# Patient Record
Sex: FEMALE | Race: WHITE | NOT HISPANIC OR LATINO | Employment: OTHER | URBAN - NONMETROPOLITAN AREA
[De-identification: names, ages, dates, MRNs, and addresses within clinical notes are randomized per-mention and may not be internally consistent; named-entity substitution may affect disease eponyms.]

---

## 2018-03-08 ENCOUNTER — HISTORICAL (OUTPATIENT)
Dept: ADMINISTRATIVE | Facility: HOSPITAL | Age: 53
End: 2018-03-08

## 2018-04-03 ENCOUNTER — HISTORICAL (OUTPATIENT)
Dept: ADMINISTRATIVE | Facility: HOSPITAL | Age: 53
End: 2018-04-03

## 2018-08-23 ENCOUNTER — HISTORICAL (OUTPATIENT)
Dept: ADMINISTRATIVE | Facility: HOSPITAL | Age: 53
End: 2018-08-23

## 2018-09-07 ENCOUNTER — HISTORICAL (OUTPATIENT)
Dept: ADMINISTRATIVE | Facility: HOSPITAL | Age: 53
End: 2018-09-07

## 2018-09-24 ENCOUNTER — HISTORICAL (OUTPATIENT)
Dept: ADMINISTRATIVE | Facility: HOSPITAL | Age: 53
End: 2018-09-24

## 2018-11-21 ENCOUNTER — HISTORICAL (OUTPATIENT)
Dept: ADMINISTRATIVE | Facility: HOSPITAL | Age: 53
End: 2018-11-21

## 2019-02-13 ENCOUNTER — HISTORICAL (OUTPATIENT)
Dept: ADMINISTRATIVE | Facility: HOSPITAL | Age: 54
End: 2019-02-13

## 2019-10-03 ENCOUNTER — HISTORICAL (OUTPATIENT)
Dept: ADMINISTRATIVE | Facility: HOSPITAL | Age: 54
End: 2019-10-03

## 2020-11-09 ENCOUNTER — HISTORICAL (OUTPATIENT)
Dept: ADMINISTRATIVE | Facility: HOSPITAL | Age: 55
End: 2020-11-09

## 2021-01-14 LAB
HUMAN PAPILLOMAVIRUS (HPV): NORMAL
PAP RECOMMENDATION EXT: NORMAL
PAP SMEAR: NORMAL

## 2021-01-19 ENCOUNTER — HISTORICAL (OUTPATIENT)
Dept: ADMINISTRATIVE | Facility: HOSPITAL | Age: 56
End: 2021-01-19

## 2021-06-09 ENCOUNTER — HISTORICAL (OUTPATIENT)
Dept: ADMINISTRATIVE | Facility: HOSPITAL | Age: 56
End: 2021-06-09

## 2022-04-06 ENCOUNTER — HISTORICAL (OUTPATIENT)
Dept: ADMINISTRATIVE | Facility: HOSPITAL | Age: 57
End: 2022-04-06

## 2022-04-10 ENCOUNTER — HISTORICAL (OUTPATIENT)
Dept: ADMINISTRATIVE | Facility: HOSPITAL | Age: 57
End: 2022-04-10

## 2022-04-30 VITALS
SYSTOLIC BLOOD PRESSURE: 110 MMHG | DIASTOLIC BLOOD PRESSURE: 76 MMHG | BODY MASS INDEX: 38.98 KG/M2 | WEIGHT: 233.94 LBS | HEIGHT: 65 IN

## 2023-01-16 ENCOUNTER — HOSPITAL ENCOUNTER (EMERGENCY)
Facility: HOSPITAL | Age: 58
Discharge: HOME | End: 2023-01-16
Attending: EMERGENCY MEDICINE
Payer: MEDICAID

## 2023-01-16 VITALS
SYSTOLIC BLOOD PRESSURE: 120 MMHG | OXYGEN SATURATION: 97 % | HEART RATE: 72 BPM | HEIGHT: 66 IN | TEMPERATURE: 98 F | RESPIRATION RATE: 18 BRPM | WEIGHT: 215 LBS | BODY MASS INDEX: 34.55 KG/M2 | DIASTOLIC BLOOD PRESSURE: 76 MMHG

## 2023-01-16 DIAGNOSIS — M77.9 INFLAMMATION AROUND JOINT: ICD-10-CM

## 2023-01-16 DIAGNOSIS — M71.162 SEPTIC PREPATELLAR BURSITIS OF LEFT KNEE: Primary | ICD-10-CM

## 2023-01-16 PROCEDURE — 25000003 PHARM REV CODE 250: Performed by: EMERGENCY MEDICINE

## 2023-01-16 PROCEDURE — 99284 EMERGENCY DEPT VISIT MOD MDM: CPT | Mod: 25

## 2023-01-16 PROCEDURE — 10060 I&D ABSCESS SIMPLE/SINGLE: CPT

## 2023-01-16 RX ORDER — SULFAMETHOXAZOLE AND TRIMETHOPRIM 800; 160 MG/1; MG/1
1 TABLET ORAL 2 TIMES DAILY
Qty: 20 TABLET | Refills: 0 | Status: SHIPPED | OUTPATIENT
Start: 2023-01-16 | End: 2023-01-26

## 2023-01-16 RX ORDER — HYDROCODONE BITARTRATE AND ACETAMINOPHEN 10; 325 MG/1; MG/1
1 TABLET ORAL
Status: COMPLETED | OUTPATIENT
Start: 2023-01-16 | End: 2023-01-16

## 2023-01-16 RX ORDER — HYDROCODONE BITARTRATE AND ACETAMINOPHEN 10; 325 MG/1; MG/1
1 TABLET ORAL EVERY 6 HOURS PRN
Qty: 20 TABLET | Refills: 0 | Status: SHIPPED | OUTPATIENT
Start: 2023-01-16 | End: 2023-01-17

## 2023-01-16 RX ORDER — LIDOCAINE HYDROCHLORIDE 10 MG/ML
5 INJECTION, SOLUTION EPIDURAL; INFILTRATION; INTRACAUDAL; PERINEURAL
Status: COMPLETED | OUTPATIENT
Start: 2023-01-16 | End: 2023-01-16

## 2023-01-16 RX ADMIN — LIDOCAINE HYDROCHLORIDE 50 MG: 10 INJECTION, SOLUTION EPIDURAL; INFILTRATION; INTRACAUDAL; PERINEURAL at 09:01

## 2023-01-16 RX ADMIN — HYDROCODONE BITARTRATE AND ACETAMINOPHEN 1 TABLET: 10; 325 TABLET ORAL at 09:01

## 2023-01-16 NOTE — ED PROVIDER NOTES
Encounter Date: 1/16/2023       History     Chief Complaint   Patient presents with    Knee Pain     C/o left knee pain with redness and swelling for 3 days     The history is provided by the patient. No  was used.   Knee Pain  This is a new problem. The current episode started more than 2 days ago. The problem occurs constantly. The problem has been gradually worsening. Pertinent negatives include no chest pain and no shortness of breath. The symptoms are aggravated by bending, standing and walking. Nothing relieves the symptoms. She has tried a warm compress for the symptoms. The treatment provided no relief.   Review of patient's allergies indicates:  No Known Allergies  Past Medical History:   Diagnosis Date    COPD (chronic obstructive pulmonary disease)      No past surgical history on file. noncontributory  No family history on file.  Social History     Tobacco Use    Smoking status: Every Day     Types: Cigarettes    Smokeless tobacco: Never     Review of Systems   Constitutional:  Negative for fever.   HENT:  Negative for sore throat.    Respiratory:  Negative for shortness of breath.    Cardiovascular:  Negative for chest pain.   Gastrointestinal:  Negative for nausea.   Genitourinary:  Negative for dysuria.   Musculoskeletal:  Negative for back pain.   Skin:  Negative for rash.   Neurological:  Negative for weakness.   Hematological:  Does not bruise/bleed easily.     Physical Exam     Initial Vitals [01/16/23 0828]   BP Pulse Resp Temp SpO2   120/76 77 16 97.6 °F (36.4 °C) 97 %      MAP       --         Physical Exam    Nursing note and vitals reviewed.  Constitutional: She appears well-developed and well-nourished.   HENT:   Head: Normocephalic and atraumatic.   Right Ear: External ear normal.   Left Ear: External ear normal.   Eyes: Conjunctivae and EOM are normal. Pupils are equal, round, and reactive to light.   Neck: Neck supple.   Normal range of motion.  Cardiovascular:  Normal  rate, regular rhythm, normal heart sounds and intact distal pulses.           Pulmonary/Chest: Breath sounds normal.   Abdominal: Abdomen is soft. Bowel sounds are normal.   Musculoskeletal:         General: Normal range of motion.      Cervical back: Normal range of motion and neck supple.        Legs:       Comments: Area of fluctuance central to the erythematous area     Neurological: She is alert and oriented to person, place, and time. GCS score is 15. GCS eye subscore is 4. GCS verbal subscore is 5. GCS motor subscore is 6.   Skin: Skin is warm and dry. Capillary refill takes less than 2 seconds.   Psychiatric: She has a normal mood and affect. Her behavior is normal. Judgment and thought content normal.       ED Course   I & D - Incision and Drainage    Date/Time: 1/16/2023 9:39 AM  Location procedure was performed: [facility] EMERGENCY DEPARTMENT  Performed by: Vickey Friend MD  Authorized by: Vickey Friend MD   Pre-operative diagnosis: septic prepatellar bursitis left knee  Post-operative diagnosis: same  Consent Done: Yes  Consent: Verbal consent obtained.  Consent given by: patient  Patient understanding: patient states understanding of the procedure being performed  Patient identity confirmed: verbally with patient  Type: abscess  Body area: lower extremity  Location details: left leg  Anesthesia: local infiltration    Anesthesia:  Local Anesthetic: lidocaine 1% without epinephrine  Anesthetic total: 3 mL    Patient sedated: no  Description of findings: largely bloody drainage with scant pus   Scalpel size: 11  Incision type: single straight  Complexity: simple  Drainage: bloody  Drainage amount: moderate  Wound treatment: incision, drainage, expression of material, wound left open, wound packed and deloculation  Packing material: 1/4 in iodoform gauze  Technical procedures used: N/A  Significant surgical tasks conducted by the assistant(s): N/A  Complications: No  Estimated blood loss  (mL): 7  Specimens: No  Implants: No  Comments: Minimal pus, mostly bloody drainage.  Pt states she has been kneeling down a good bit doing tasks around her home.  Possibly infected hematoma.      Labs Reviewed - No data to display       Imaging Results              X-Ray Knee 3 View Left (Final result)  Result time 01/16/23 09:11:49      Final result by Carlos Clark MD (01/16/23 09:11:49)                   Impression:      Mild tricompartmental osteoarthritis of the left knee.      Electronically signed by: Carlos Clark MD  Date:    01/16/2023  Time:    09:11               Narrative:    EXAMINATION:  Three radiographic views of the KNEE.    CLINICAL HISTORY:  Enthesopathy, unspecified    TECHNIQUE:  Three radiographic views of the KNEE    COMPARISON:  Left knee radiograph 09/24/2018.    FINDINGS:  Three views of the left knee demonstrate normal alignment.  There is mild tricompartmental osteoarthritis.  There is an unchanged osseous fragment adjacent to the upper pole of the patella which is well corticated consistent with an avulsion fracture which is unchanged from the prior exam.  There is no soft tissue swelling.                                       Medications   HYDROcodone-acetaminophen  mg per tablet 1 tablet (1 tablet Oral Given 1/16/23 0900)   LIDOcaine (PF) 10 mg/ml (1%) injection 50 mg (50 mg Infiltration Given 1/16/23 0900)      Exam suggestive of septic prepatellar bursitis.  Will get X-ray and perform I&D of the area.                 Procedure well tolerated.  Fluctuant area drained in its entirety and loculations broken up.  Packed with iodoform and wound care instructions given.  Pt verbalizes understanding.  No barriers to treatment were encountered.       Clinical Impression:   Final diagnoses:  [M77.9] Inflammation around joint  [M71.162] Septic prepatellar bursitis of left knee (Primary)        ED Disposition Condition    Discharge Stable          ED Prescriptions       Medication Sig  Dispense Start Date End Date Auth. Provider    sulfamethoxazole-trimethoprim 800-160mg (BACTRIM DS) 800-160 mg Tab Take 1 tablet by mouth 2 (two) times daily. for 10 days 20 tablet 1/16/2023 1/26/2023 Vickey Friend MD    HYDROcodone-acetaminophen (NORCO)  mg per tablet Take 1 tablet by mouth every 6 (six) hours as needed for Pain. 20 tablet 1/16/2023 1/21/2023 Vickey Friend MD          Follow-up Information       Follow up With Specialties Details Why Contact Info    Follow up with your primary MD in 3-5 days if not improved.  Return to ED for worsening symptoms.                 Vickey Friend MD  01/16/23 [id number]

## 2023-01-17 ENCOUNTER — HOSPITAL ENCOUNTER (OUTPATIENT)
Dept: RADIOLOGY | Facility: HOSPITAL | Age: 58
Discharge: HOME | End: 2023-01-17
Attending: FAMILY MEDICINE
Payer: MEDICARE

## 2023-01-17 DIAGNOSIS — M25.561 PAIN IN RIGHT KNEE: ICD-10-CM

## 2023-01-17 PROCEDURE — 73562 X-RAY EXAM OF KNEE 3: CPT | Mod: TC,RT

## 2023-01-19 ENCOUNTER — DOCUMENTATION ONLY (OUTPATIENT)
Dept: ADMINISTRATIVE | Facility: HOSPITAL | Age: 58
End: 2023-01-19
Payer: MEDICARE

## 2023-01-19 DIAGNOSIS — M71.162 SEPTIC PREPATELLAR BURSITIS, LEFT: Primary | ICD-10-CM

## 2023-01-30 ENCOUNTER — HOSPITAL ENCOUNTER (OUTPATIENT)
Dept: RADIOLOGY | Facility: HOSPITAL | Age: 58
Discharge: HOME | End: 2023-01-30
Attending: FAMILY MEDICINE
Payer: MEDICARE

## 2023-01-30 DIAGNOSIS — M71.162 SEPTIC PREPATELLAR BURSITIS, LEFT: ICD-10-CM

## 2023-01-30 PROCEDURE — 76882 US LMTD JT/FCL EVL NVASC XTR: CPT | Mod: TC,LT

## 2023-04-18 DIAGNOSIS — M25.562 BILATERAL KNEE PAIN: Primary | ICD-10-CM

## 2023-04-18 DIAGNOSIS — M25.561 BILATERAL KNEE PAIN: Primary | ICD-10-CM

## 2023-04-19 ENCOUNTER — OFFICE VISIT (OUTPATIENT)
Dept: ORTHOPEDICS | Facility: CLINIC | Age: 58
End: 2023-04-19
Payer: MEDICARE

## 2023-04-19 VITALS
SYSTOLIC BLOOD PRESSURE: 128 MMHG | BODY MASS INDEX: 35.68 KG/M2 | HEART RATE: 72 BPM | HEIGHT: 66 IN | WEIGHT: 222 LBS | DIASTOLIC BLOOD PRESSURE: 65 MMHG

## 2023-04-19 DIAGNOSIS — M25.561 BILATERAL KNEE PAIN: ICD-10-CM

## 2023-04-19 DIAGNOSIS — M25.562 BILATERAL KNEE PAIN: ICD-10-CM

## 2023-04-19 DIAGNOSIS — M17.11 PRIMARY OSTEOARTHRITIS OF RIGHT KNEE: Primary | ICD-10-CM

## 2023-04-19 PROCEDURE — 20610 DRAIN/INJ JOINT/BURSA W/O US: CPT | Mod: RT,,, | Performed by: REHABILITATION UNIT

## 2023-04-19 PROCEDURE — 3074F PR MOST RECENT SYSTOLIC BLOOD PRESSURE < 130 MM HG: ICD-10-PCS | Mod: CPTII,,, | Performed by: REHABILITATION UNIT

## 2023-04-19 PROCEDURE — 3078F DIAST BP <80 MM HG: CPT | Mod: CPTII,,, | Performed by: REHABILITATION UNIT

## 2023-04-19 PROCEDURE — 20610 LARGE JOINT ASPIRATION/INJECTION: R KNEE: ICD-10-PCS | Mod: RT,,, | Performed by: REHABILITATION UNIT

## 2023-04-19 PROCEDURE — 3074F SYST BP LT 130 MM HG: CPT | Mod: CPTII,,, | Performed by: REHABILITATION UNIT

## 2023-04-19 PROCEDURE — 3008F PR BODY MASS INDEX (BMI) DOCUMENTED: ICD-10-PCS | Mod: CPTII,,, | Performed by: REHABILITATION UNIT

## 2023-04-19 PROCEDURE — 99203 PR OFFICE/OUTPT VISIT, NEW, LEVL III, 30-44 MIN: ICD-10-PCS | Mod: 25,,, | Performed by: REHABILITATION UNIT

## 2023-04-19 PROCEDURE — 3008F BODY MASS INDEX DOCD: CPT | Mod: CPTII,,, | Performed by: REHABILITATION UNIT

## 2023-04-19 PROCEDURE — 1159F MED LIST DOCD IN RCRD: CPT | Mod: CPTII,,, | Performed by: REHABILITATION UNIT

## 2023-04-19 PROCEDURE — 99203 OFFICE O/P NEW LOW 30 MIN: CPT | Mod: 25,,, | Performed by: REHABILITATION UNIT

## 2023-04-19 PROCEDURE — 3078F PR MOST RECENT DIASTOLIC BLOOD PRESSURE < 80 MM HG: ICD-10-PCS | Mod: CPTII,,, | Performed by: REHABILITATION UNIT

## 2023-04-19 PROCEDURE — 1159F PR MEDICATION LIST DOCUMENTED IN MEDICAL RECORD: ICD-10-PCS | Mod: CPTII,,, | Performed by: REHABILITATION UNIT

## 2023-04-19 RX ORDER — DULOXETIN HYDROCHLORIDE 60 MG/1
60 CAPSULE, DELAYED RELEASE ORAL
COMMUNITY
Start: 2023-03-27

## 2023-04-19 RX ORDER — FLUTICASONE FUROATE, UMECLIDINIUM BROMIDE AND VILANTEROL TRIFENATATE 200; 62.5; 25 UG/1; UG/1; UG/1
1 POWDER RESPIRATORY (INHALATION)
COMMUNITY
Start: 2023-03-27

## 2023-04-19 RX ORDER — ALBUTEROL SULFATE 90 UG/1
2 AEROSOL, METERED RESPIRATORY (INHALATION)
COMMUNITY
Start: 2023-03-27

## 2023-04-19 RX ORDER — ESOMEPRAZOLE MAGNESIUM 40 MG/1
CAPSULE, DELAYED RELEASE ORAL DAILY PRN
COMMUNITY

## 2023-04-19 RX ORDER — CELECOXIB 200 MG/1
200 CAPSULE ORAL
Status: ON HOLD | COMMUNITY
Start: 2023-03-27 | End: 2024-01-11 | Stop reason: HOSPADM

## 2023-04-19 RX ORDER — ALBUTEROL SULFATE 0.83 MG/ML
SOLUTION RESPIRATORY (INHALATION)
COMMUNITY

## 2023-04-19 RX ORDER — PANTOPRAZOLE SODIUM 40 MG/1
40 TABLET, DELAYED RELEASE ORAL EVERY MORNING
COMMUNITY
Start: 2023-03-27

## 2023-04-19 RX ORDER — CYCLOBENZAPRINE HCL 10 MG
10 TABLET ORAL
Status: ON HOLD | COMMUNITY
Start: 2023-03-27 | End: 2024-01-11 | Stop reason: HOSPADM

## 2023-04-19 RX ORDER — LIDOCAINE 50 MG/G
PATCH TOPICAL
COMMUNITY

## 2023-04-19 RX ORDER — PREGABALIN 100 MG/1
100 CAPSULE ORAL 3 TIMES DAILY
COMMUNITY
Start: 2023-03-27

## 2023-04-19 RX ADMIN — LIDOCAINE HYDROCHLORIDE 4 MG: 20 INJECTION, SOLUTION INFILTRATION; PERINEURAL at 02:04

## 2023-04-19 RX ADMIN — BETAMETHASONE SODIUM PHOSPHATE AND BETAMETHASONE ACETATE 12 MG: 3; 3 INJECTION, SUSPENSION INTRA-ARTICULAR; INTRALESIONAL; INTRAMUSCULAR; SOFT TISSUE at 02:04

## 2023-04-19 NOTE — PROGRESS NOTES
Subjective:      Patient ID: Yaneth Deluna is a 57 y.o. female.    Chief Complaint: Knee Pain (Pt reports a constant pain in her Rt knee that aggravates when she is walking. Pain feels sharp/stabbing at times, feels like will give out on her very often and unable patient to do her daily activities. Pt had several cortisone injections in the past, but the last ones didnt help as expected.)    HPI:   Yaneth Deluna is a 57 y.o. female who presents today for initial evaluation of her right knee.   She has an acute exacerbation of her chronic right knee pain.  Pain has been present to her right knee for several years.  She has previously been under the treatment of Dr. Rocha her primary care provider.  He has been providing her with steroid injections.  Her most recent injection was 01/17/2023.  She reports good relief of her pain but this is temporary.  Pain is localized diffusely about her knee.  It is  interfering with her activities of daily living.  She has tried pain patches and is also taking Celebrex.  Past medical history is significant for COPD and she is smoking 1 pack per day.  No instability reported.  She does report reduced motion with her knee pain.      Past Medical History:   Diagnosis Date    Arthritis     COPD (chronic obstructive pulmonary disease)     Osteoarthritis      Past Surgical History:   Procedure Laterality Date    BACK SURGERY      CARPAL TUNNEL RELEASE Bilateral     CHOLECYSTECTOMY      NECK FUSION      SHOULDER SURGERY       Social History     Socioeconomic History    Marital status:    Tobacco Use    Smoking status: Every Day     Packs/day: 1.00     Types: Cigarettes    Smokeless tobacco: Never   Substance and Sexual Activity    Alcohol use: Yes     Alcohol/week: 2.0 standard drinks     Types: 1 Glasses of wine, 1 Cans of beer per week     Comment: Occasionally    Drug use: Never    Sexual activity: Yes     Partners: Male         Current Outpatient Medications:     albuterol  "(PROVENTIL) 2.5 mg /3 mL (0.083 %) nebulizer solution, albuterol sulfate 2.5 mg/3 mL (0.083 %) solution for nebulization  use as needed, Disp: , Rfl:     albuterol (PROVENTIL/VENTOLIN HFA) 90 mcg/actuation inhaler, Inhale 2 puffs into the lungs., Disp: , Rfl:     celecoxib (CELEBREX) 200 MG capsule, Take 200 mg by mouth., Disp: , Rfl:     cyclobenzaprine (FLEXERIL) 10 MG tablet, Take 10 mg by mouth., Disp: , Rfl:     DULoxetine (CYMBALTA) 60 MG capsule, Take 60 mg by mouth., Disp: , Rfl:     esomeprazole (NEXIUM) 40 MG capsule, esomeprazole magnesium 40 mg capsule,delayed release, Disp: , Rfl:     LIDOcaine (LIDODERM) 5 %, lidocaine 5 % topical patch, Disp: , Rfl:     LYRICA 100 mg capsule, Take 100 mg by mouth 3 (three) times daily., Disp: , Rfl:     pantoprazole (PROTONIX) 40 MG tablet, Take 40 mg by mouth every morning., Disp: , Rfl:     TRELEGY ELLIPTA 200-62.5-25 mcg inhaler, Inhale 1 puff into the lungs., Disp: , Rfl:   Review of patient's allergies indicates:  No Known Allergies    /65   Pulse 72   Ht 5' 6" (1.676 m)   Wt 100.7 kg (222 lb)   BMI 35.83 kg/m²     Comprehensive review of systems completed and negative except as per HPI.        Objective:   Head: Normocephalic, without obvious abnormality, atraumatic  Eyes: conjunctivae/corneas clear. EOM's intact  Ears: normal external appearance  Nose: Nares normal. Septum midline. Mucosa normal. No drainage  Throat: normal findings: lips normal without lesions  Lungs: unlabored breathing on room air  Chest wall: symmetric chest rise  Heart: regular rate and rhythm  Pulses: 2+ and symmetric  Skin: Skin color, texture, turgor normal. No rashes or lesions  Neurologic: Grossly normal    right KNEE:     Alignment: neutral  Appearance: skin in intact without lesion  Effusion: small  Gait: antalgic  Straight Leg Raise: negative  Log Roll: negative  Hip ROM: full and painless  Ankle ROM: full and painless   Knee ROM:  0 - 100  Tenderness: TTP to medial joint " line and diffusely   Patellar Mobility: reduced  Patellar grind: +  PF Crepitus: +       Valgus Stress @ 0 deg: stable  Valgus Stress @ 30 deg: stable  Varus Stress @ 0 deg: stable  Varus Stress @ 30 deg: stable  Lachman: stable  Ant Drawer: negative   Post Drawer: negative  Posterior Sag Sign: negative  Neurological deficits: SILT dp/sp/t distributions  Motor: 5/5 EHL/FHL/TA/GS    Warm well perfused lower extremity with capillary refill less than 2 seconds and sensation intact to light touch in the terminal nerve distributions. Calf soft and easily compressible without clinical sign of DVT. No palpable popliteal lymphadenopathy    Assessment:     Imaging:   Narrative & Impression  EXAMINATION:  XR KNEE 3 VIEW RIGHT     CLINICAL HISTORY:  Pain in right knee; Pain in right knee;.     COMPARISON:  None available.     FINDINGS:  AP, lateral, and oblique views reveal no definite fracture or dislocation.  There is narrowing of medial compartment space.  Scattered osteophyte formation is seen.     Impression:     1. No acute osseous defect identified  2. Osteoarthritis  3. MR examination would allow further evaluation if clinically indicated        Electronically signed by: Regino Iraheta  Date:                                            01/17/2023  Time:                                           14:50      Radiographs of the right knee reviewed showing osteoarthritis changes with joint space narrowing and osteophyte formation.    Large Joint Aspiration/Injection: R knee    Date/Time: 4/19/2023 2:00 PM  Performed by: Ifeanyi Braga MD  Authorized by: Ifeanyi Braga MD     Consent Done?:  Yes (Verbal)  Indications:  Arthritis and pain  Site marked: the procedure site was marked    Timeout: prior to procedure the correct patient, procedure, and site was verified    Prep: patient was prepped and draped in usual sterile fashion    Local anesthesia used?: No      Details:  Needle Size:  21 G  Ultrasonic Guidance for needle  placement?: No    Approach:  Lateral  Location:  Knee  Site:  R knee  Medications:  12 mg betamethasone acetate-betamethasone sodium phosphate 6 mg/mL; 4 mg LIDOcaine HCL 20 mg/ml (2%) 20 mg/mL (2 %)  Patient tolerance:  Patient tolerated the procedure well with no immediate complications          1. Primary osteoarthritis of right knee    2. Bilateral knee pain          Plan:       Orders Placed This Encounter    Large Joint Aspiration/Injection       Imaging and exam findings discussed with the patient.  She has acute exacerbation of her chronic right knee pain with known osteoarthritis.  We discussed her options.  She elects to proceed with corticosteroid injection which was provided as above and she tolerated this well.  I have also counseled her on weight loss and smoking cessation.  She will follow up with me as needed when she has return of her knee pain.   She is aware that injections may be completed as frequently as every 3 months.  She may also elect to proceed with viscosupplementation at her next visit.  I have advised her to let us know this prior to her visit so that we can have it approved if this is the route she chooses. All of her questions were answered and she was in agreement.

## 2023-04-20 RX ORDER — BETAMETHASONE SODIUM PHOSPHATE AND BETAMETHASONE ACETATE 3; 3 MG/ML; MG/ML
12 INJECTION, SUSPENSION INTRA-ARTICULAR; INTRALESIONAL; INTRAMUSCULAR; SOFT TISSUE
Status: DISCONTINUED | OUTPATIENT
Start: 2023-04-19 | End: 2023-04-20 | Stop reason: HOSPADM

## 2023-04-20 RX ORDER — LIDOCAINE HYDROCHLORIDE 20 MG/ML
4 INJECTION, SOLUTION INFILTRATION; PERINEURAL
Status: DISCONTINUED | OUTPATIENT
Start: 2023-04-19 | End: 2023-04-20 | Stop reason: HOSPADM

## 2023-05-01 ENCOUNTER — HOSPITAL ENCOUNTER (OUTPATIENT)
Dept: RADIOLOGY | Facility: CLINIC | Age: 58
Discharge: HOME | End: 2023-05-01
Attending: REHABILITATION UNIT
Payer: MEDICARE

## 2023-05-01 ENCOUNTER — OFFICE VISIT (OUTPATIENT)
Dept: ORTHOPEDICS | Facility: CLINIC | Age: 58
End: 2023-05-01
Payer: MEDICARE

## 2023-05-01 DIAGNOSIS — M25.561 RIGHT KNEE PAIN, UNSPECIFIED CHRONICITY: ICD-10-CM

## 2023-05-01 DIAGNOSIS — M25.561 RIGHT KNEE PAIN, UNSPECIFIED CHRONICITY: Primary | ICD-10-CM

## 2023-05-01 DIAGNOSIS — M17.11 OSTEOARTHRITIS OF RIGHT KNEE, UNSPECIFIED OSTEOARTHRITIS TYPE: ICD-10-CM

## 2023-05-01 PROCEDURE — 73564 X-RAY EXAM KNEE 4 OR MORE: CPT | Mod: RT,,, | Performed by: REHABILITATION UNIT

## 2023-05-01 PROCEDURE — 73564 XR KNEE COMP 4 OR MORE VIEWS RIGHT: ICD-10-PCS | Mod: RT,,, | Performed by: REHABILITATION UNIT

## 2023-05-01 PROCEDURE — 1159F PR MEDICATION LIST DOCUMENTED IN MEDICAL RECORD: ICD-10-PCS | Mod: CPTII,,, | Performed by: REHABILITATION UNIT

## 2023-05-01 PROCEDURE — 99213 PR OFFICE/OUTPT VISIT, EST, LEVL III, 20-29 MIN: ICD-10-PCS | Mod: ,,, | Performed by: REHABILITATION UNIT

## 2023-05-01 PROCEDURE — 1159F MED LIST DOCD IN RCRD: CPT | Mod: CPTII,,, | Performed by: REHABILITATION UNIT

## 2023-05-01 PROCEDURE — 99213 OFFICE O/P EST LOW 20 MIN: CPT | Mod: ,,, | Performed by: REHABILITATION UNIT

## 2023-05-01 NOTE — PROGRESS NOTES
Subjective:      Patient ID: Yaneth Deluna is a 57 y.o. female.    Chief Complaint: Pain of the Right Knee and Pain (Last injection back in january states it didnt help at all and also has a prior SX on her right knee forgot to mention it at her last her appoinment would like to do an MRI feels a pulling sensation on the back and side)    HPI:   Yaneth Deluna is a 57 y.o. female who presents today for follow up evaluation of her right knee.   She was last seen on 04/19/2023.  She was provided with a steroid injection.  She reports that this provided very mild relief for a very short period of time.  She is here to discuss any additional options.  No instability or mechanical symptoms.  Pain is diffusely about the knee.  She has reduced range of motion and strength.  No sensory or motor changes.  She continues to smoke.    Initial HPI:  She has an acute exacerbation of her chronic right knee pain.  Pain has been present to her right knee for several years.  She has previously been under the treatment of Dr. Rocha her primary care provider.  He has been providing her with steroid injections.  Her most recent injection was 01/17/2023.  She reports good relief of her pain but this is temporary.  Pain is localized diffusely about her knee.  It is  interfering with her activities of daily living.  She has tried pain patches and is also taking Celebrex.  Past medical history is significant for COPD and she is smoking 1 pack per day.  No instability reported.  She does report reduced motion with her knee pain.      Past Medical History:   Diagnosis Date    Arthritis     COPD (chronic obstructive pulmonary disease)     Osteoarthritis      Past Surgical History:   Procedure Laterality Date    BACK SURGERY      CARPAL TUNNEL RELEASE Bilateral     CHOLECYSTECTOMY      KNEE SURGERY      NECK FUSION      SHOULDER SURGERY       Social History     Socioeconomic History    Marital status:    Tobacco Use    Smoking status:  Every Day     Packs/day: 1.00     Types: Cigarettes    Smokeless tobacco: Never   Substance and Sexual Activity    Alcohol use: Yes     Alcohol/week: 2.0 standard drinks     Types: 1 Glasses of wine, 1 Cans of beer per week     Comment: Occasionally    Drug use: Never    Sexual activity: Yes     Partners: Male         Current Outpatient Medications:     albuterol (PROVENTIL) 2.5 mg /3 mL (0.083 %) nebulizer solution, albuterol sulfate 2.5 mg/3 mL (0.083 %) solution for nebulization  use as needed, Disp: , Rfl:     albuterol (PROVENTIL/VENTOLIN HFA) 90 mcg/actuation inhaler, Inhale 2 puffs into the lungs., Disp: , Rfl:     celecoxib (CELEBREX) 200 MG capsule, Take 200 mg by mouth., Disp: , Rfl:     cyclobenzaprine (FLEXERIL) 10 MG tablet, Take 10 mg by mouth., Disp: , Rfl:     DULoxetine (CYMBALTA) 60 MG capsule, Take 60 mg by mouth., Disp: , Rfl:     esomeprazole (NEXIUM) 40 MG capsule, esomeprazole magnesium 40 mg capsule,delayed release, Disp: , Rfl:     LIDOcaine (LIDODERM) 5 %, lidocaine 5 % topical patch, Disp: , Rfl:     LYRICA 100 mg capsule, Take 100 mg by mouth 3 (three) times daily., Disp: , Rfl:     pantoprazole (PROTONIX) 40 MG tablet, Take 40 mg by mouth every morning., Disp: , Rfl:     TRELEGY ELLIPTA 200-62.5-25 mcg inhaler, Inhale 1 puff into the lungs., Disp: , Rfl:   Review of patient's allergies indicates:  No Known Allergies    There were no vitals taken for this visit.    Comprehensive review of systems completed and negative except as per HPI.        Objective:   Head: Normocephalic, without obvious abnormality, atraumatic  Eyes: conjunctivae/corneas clear. EOM's intact  Ears: normal external appearance  Nose: Nares normal. Septum midline. Mucosa normal. No drainage  Throat: normal findings: lips normal without lesions  Lungs: unlabored breathing on room air  Chest wall: symmetric chest rise  Heart: regular rate and rhythm  Pulses: 2+ and symmetric  Skin: Skin color, texture, turgor normal. No  rashes or lesions  Neurologic: Grossly normal    right KNEE:     Alignment: neutral  Appearance: skin in intact without lesion  Effusion: small  Gait: antalgic  Straight Leg Raise: negative  Log Roll: negative  Hip ROM: full and painless  Ankle ROM: full and painless   Knee ROM:  0 - 100  Tenderness: TTP to medial joint line and diffusely   Patellar Mobility: reduced  Patellar grind: +  PF Crepitus: +       Valgus Stress @ 0 deg: stable  Valgus Stress @ 30 deg: stable  Varus Stress @ 0 deg: stable  Varus Stress @ 30 deg: stable  Lachman: stable  Ant Drawer: negative   Post Drawer: negative  Posterior Sag Sign: negative  Neurological deficits: SILT dp/sp/t distributions  Motor: 5/5 EHL/FHL/TA/GS    Warm well perfused lower extremity with capillary refill less than 2 seconds and sensation intact to light touch in the terminal nerve distributions. Calf soft and easily compressible without clinical sign of DVT. No palpable popliteal lymphadenopathy    Assessment:     Imaging:   Narrative & Impression  EXAMINATION:  XR KNEE 3 VIEW RIGHT     CLINICAL HISTORY:  Pain in right knee; Pain in right knee;.     COMPARISON:  None available.     FINDINGS:  AP, lateral, and oblique views reveal no definite fracture or dislocation.  There is narrowing of medial compartment space.  Scattered osteophyte formation is seen.     Impression:     1. No acute osseous defect identified  2. Osteoarthritis  3. MR examination would allow further evaluation if clinically indicated        Electronically signed by: Regino Iraheta  Date:                                            01/17/2023  Time:                                           14:50      Radiographs of the right knee reviewed showing osteoarthritis changes with joint space narrowing and osteophyte formation.          1. Right knee pain, unspecified chronicity    2. Osteoarthritis of right knee, unspecified osteoarthritis type            Plan:       Orders Placed This Encounter    X-Ray  Knee Complete 4 Or More Views Right    Prior authorization Order     Imaging and exam findings discussed with the patient.  She has persistent right knee pain after very short term relief after a steroid injection.  She would like to proceed with viscosupplementation.  This will be submitted for and I will see her back once this is approved.  Continue to work on weight loss and smoking cessation.  All questions were answered and she was in agreement with the plan.

## 2023-05-22 ENCOUNTER — OFFICE VISIT (OUTPATIENT)
Dept: ORTHOPEDICS | Facility: CLINIC | Age: 58
End: 2023-05-22
Payer: MEDICARE

## 2023-05-22 VITALS
HEIGHT: 66 IN | WEIGHT: 222 LBS | DIASTOLIC BLOOD PRESSURE: 58 MMHG | SYSTOLIC BLOOD PRESSURE: 111 MMHG | BODY MASS INDEX: 35.68 KG/M2 | HEART RATE: 79 BPM

## 2023-05-22 DIAGNOSIS — M17.11 PRIMARY OSTEOARTHRITIS OF RIGHT KNEE: ICD-10-CM

## 2023-05-22 DIAGNOSIS — M17.11 OSTEOARTHRITIS OF RIGHT KNEE, UNSPECIFIED OSTEOARTHRITIS TYPE: Primary | ICD-10-CM

## 2023-05-22 PROCEDURE — 99499 NO LOS: ICD-10-PCS | Mod: ,,, | Performed by: REHABILITATION UNIT

## 2023-05-22 PROCEDURE — 99499 UNLISTED E&M SERVICE: CPT | Mod: ,,, | Performed by: REHABILITATION UNIT

## 2023-05-22 PROCEDURE — 20610 LARGE JOINT ASPIRATION/INJECTION: R KNEE: ICD-10-PCS | Mod: RT,,, | Performed by: REHABILITATION UNIT

## 2023-05-22 PROCEDURE — 20610 DRAIN/INJ JOINT/BURSA W/O US: CPT | Mod: RT,,, | Performed by: REHABILITATION UNIT

## 2023-05-22 NOTE — PROGRESS NOTES
Patient comes in today for scheduled viscosupplementation injection.  This was provided as below and she tolerated it well.  Post-injection care was discussed.  She will follow up as needed.    Large Joint Aspiration/Injection: R knee    Date/Time: 5/22/2023 1:15 PM  Performed by: Ifeanyi Braga MD  Authorized by: Ifeanyi Braga MD     Consent Done?:  Yes (Verbal)  Indications:  Pain and arthritis  Site marked: the procedure site was marked    Timeout: prior to procedure the correct patient, procedure, and site was verified    Prep: patient was prepped and draped in usual sterile fashion      Local anesthesia used?: Yes    Local anesthetic:  Lidocaine 2% without epinephrine  Anesthetic total (ml):  4      Details:  Needle Size:  18 G and 22 G  Ultrasonic Guidance for needle placement?: No    Approach:  Lateral (superolateral)  Location:  Knee  Site:  R knee  Medications:  48 mg hylan g-f 20 48 mg/6 mL  Patient tolerance:  Patient tolerated the procedure well with no immediate complications

## 2023-07-06 ENCOUNTER — TELEPHONE (OUTPATIENT)
Dept: ORTHOPEDICS | Facility: CLINIC | Age: 58
End: 2023-07-06
Payer: MEDICARE

## 2023-07-06 NOTE — TELEPHONE ENCOUNTER
Pt called and lvm stating she would another visco injection in her right knee.     Patient received last visco injection on 05/22/23 which patient would not be eligible to receive another injection at this time. Would be eligible for another injection 6 months and a day from last injection date.     Will speak with Dr. Braga to see if patient would be eligible to receive a cortisone injection as her last inj for cortisone was on 04/19/23.     Please advise

## 2023-07-06 NOTE — TELEPHONE ENCOUNTER
Was able to call patient who advised she would perfer to go through her PCP for cortisone injection. Advised pt as well she would not be due for another shot of viso until November 24, Pt states she will give us a call around November to get shot set up again. Advised pt if she is needing anything in the mean to give us a call. Pt was in agreement with this and voiced her understanding.

## 2023-09-11 ENCOUNTER — OFFICE VISIT (OUTPATIENT)
Dept: ORTHOPEDICS | Facility: CLINIC | Age: 58
End: 2023-09-11
Payer: MEDICARE

## 2023-09-11 VITALS
BODY MASS INDEX: 35.36 KG/M2 | HEIGHT: 66 IN | HEART RATE: 65 BPM | DIASTOLIC BLOOD PRESSURE: 72 MMHG | SYSTOLIC BLOOD PRESSURE: 126 MMHG | WEIGHT: 220 LBS

## 2023-09-11 DIAGNOSIS — M17.11 PRIMARY OSTEOARTHRITIS OF RIGHT KNEE: Primary | ICD-10-CM

## 2023-09-11 PROCEDURE — 3078F DIAST BP <80 MM HG: CPT | Mod: CPTII,,, | Performed by: REHABILITATION UNIT

## 2023-09-11 PROCEDURE — 1159F PR MEDICATION LIST DOCUMENTED IN MEDICAL RECORD: ICD-10-PCS | Mod: CPTII,,, | Performed by: REHABILITATION UNIT

## 2023-09-11 PROCEDURE — 99213 PR OFFICE/OUTPT VISIT, EST, LEVL III, 20-29 MIN: ICD-10-PCS | Mod: ,,, | Performed by: REHABILITATION UNIT

## 2023-09-11 PROCEDURE — 3008F BODY MASS INDEX DOCD: CPT | Mod: CPTII,,, | Performed by: REHABILITATION UNIT

## 2023-09-11 PROCEDURE — 99213 OFFICE O/P EST LOW 20 MIN: CPT | Mod: ,,, | Performed by: REHABILITATION UNIT

## 2023-09-11 PROCEDURE — 3074F SYST BP LT 130 MM HG: CPT | Mod: CPTII,,, | Performed by: REHABILITATION UNIT

## 2023-09-11 PROCEDURE — 3078F PR MOST RECENT DIASTOLIC BLOOD PRESSURE < 80 MM HG: ICD-10-PCS | Mod: CPTII,,, | Performed by: REHABILITATION UNIT

## 2023-09-11 PROCEDURE — 1159F MED LIST DOCD IN RCRD: CPT | Mod: CPTII,,, | Performed by: REHABILITATION UNIT

## 2023-09-11 PROCEDURE — 3074F PR MOST RECENT SYSTOLIC BLOOD PRESSURE < 130 MM HG: ICD-10-PCS | Mod: CPTII,,, | Performed by: REHABILITATION UNIT

## 2023-09-11 PROCEDURE — 3008F PR BODY MASS INDEX (BMI) DOCUMENTED: ICD-10-PCS | Mod: CPTII,,, | Performed by: REHABILITATION UNIT

## 2023-09-11 NOTE — PROGRESS NOTES
Subjective:      Patient ID: Yaneth Deluna is a 58 y.o. female.    Chief Complaint: Follow-up (f/u from injection on 5/22/23. patient states it feels worst. wakes her up at night. patient does ice. )    HPI:   Yaneth Deluna is a 58 y.o. female who presents today for follow up evaluation of her right knee.   She was last seen in March 2023.  She was provided with a viscosupplementation injection.  She reports no significant relief.  She is here to discuss any additional options.  No instability or mechanical symptoms.  Pain is diffusely about the knee.  She has reduced range of motion and strength.  No sensory or motor changes.  She continues to smoke.    Initial HPI:  She has an acute exacerbation of her chronic right knee pain.  Pain has been present to her right knee for several years.  She has previously been under the treatment of Dr. Rocha her primary care provider.  He has been providing her with steroid injections.  Her most recent injection was 01/17/2023.  She reports good relief of her pain but this is temporary.  Pain is localized diffusely about her knee.  It is  interfering with her activities of daily living.  She has tried pain patches and is also taking Celebrex.  Past medical history is significant for COPD and she is smoking 1 pack per day.  No instability reported.  She does report reduced motion with her knee pain.      Past Medical History:   Diagnosis Date    Arthritis     COPD (chronic obstructive pulmonary disease)     Osteoarthritis      Past Surgical History:   Procedure Laterality Date    BACK SURGERY      CARPAL TUNNEL RELEASE Bilateral     CHOLECYSTECTOMY      KNEE SURGERY      NECK FUSION      SHOULDER SURGERY       Social History     Socioeconomic History    Marital status:    Tobacco Use    Smoking status: Every Day     Current packs/day: 1.00     Types: Cigarettes    Smokeless tobacco: Never   Substance and Sexual Activity    Alcohol use: Yes     Alcohol/week: 2.0 standard  "drinks of alcohol     Types: 1 Glasses of wine, 1 Cans of beer per week     Comment: Occasionally    Drug use: Never    Sexual activity: Yes     Partners: Male         Current Outpatient Medications:     albuterol (PROVENTIL) 2.5 mg /3 mL (0.083 %) nebulizer solution, albuterol sulfate 2.5 mg/3 mL (0.083 %) solution for nebulization  use as needed, Disp: , Rfl:     albuterol (PROVENTIL/VENTOLIN HFA) 90 mcg/actuation inhaler, Inhale 2 puffs into the lungs., Disp: , Rfl:     celecoxib (CELEBREX) 200 MG capsule, Take 200 mg by mouth., Disp: , Rfl:     cyclobenzaprine (FLEXERIL) 10 MG tablet, Take 10 mg by mouth., Disp: , Rfl:     DULoxetine (CYMBALTA) 60 MG capsule, Take 60 mg by mouth., Disp: , Rfl:     esomeprazole (NEXIUM) 40 MG capsule, esomeprazole magnesium 40 mg capsule,delayed release, Disp: , Rfl:     LYRICA 100 mg capsule, Take 100 mg by mouth 3 (three) times daily., Disp: , Rfl:     pantoprazole (PROTONIX) 40 MG tablet, Take 40 mg by mouth every morning., Disp: , Rfl:     TRELEGY ELLIPTA 200-62.5-25 mcg inhaler, Inhale 1 puff into the lungs., Disp: , Rfl:     LIDOcaine (LIDODERM) 5 %, lidocaine 5 % topical patch, Disp: , Rfl:   Review of patient's allergies indicates:  No Known Allergies    /72 (BP Location: Right arm, Patient Position: Sitting, BP Method: Large (Automatic))   Pulse 65   Ht 5' 6" (1.676 m)   Wt 99.8 kg (220 lb)   BMI 35.51 kg/m²     Comprehensive review of systems completed and negative except as per HPI.        Objective:   Head: Normocephalic, without obvious abnormality, atraumatic  Eyes: conjunctivae/corneas clear. EOM's intact  Ears: normal external appearance  Nose: Nares normal. Septum midline. Mucosa normal. No drainage  Throat: normal findings: lips normal without lesions  Lungs: unlabored breathing on room air  Chest wall: symmetric chest rise  Heart: regular rate and rhythm  Pulses: 2+ and symmetric  Skin: Skin color, texture, turgor normal. No rashes or " lesions  Neurologic: Grossly normal    right KNEE:     Alignment: neutral  Appearance: skin in intact without lesion  Effusion: small  Gait: antalgic  Straight Leg Raise: negative  Log Roll: negative  Hip ROM: full and painless  Ankle ROM: full and painless   Knee ROM:  0 - 100  Tenderness: TTP to medial joint line and diffusely   Patellar Mobility: reduced  Patellar grind: +  PF Crepitus: +       Valgus Stress @ 0 deg: stable  Valgus Stress @ 30 deg: stable  Varus Stress @ 0 deg: stable  Varus Stress @ 30 deg: stable  Lachman: stable  Ant Drawer: negative   Post Drawer: negative  Posterior Sag Sign: negative  Neurological deficits: SILT dp/sp/t distributions  Motor: 5/5 EHL/FHL/TA/GS    Warm well perfused lower extremity with capillary refill less than 2 seconds and sensation intact to light touch in the terminal nerve distributions. Calf soft and easily compressible without clinical sign of DVT. No palpable popliteal lymphadenopathy    Assessment:     Imaging:   Narrative & Impression  EXAMINATION:  XR KNEE 3 VIEW RIGHT     CLINICAL HISTORY:  Pain in right knee; Pain in right knee;.     COMPARISON:  None available.     FINDINGS:  AP, lateral, and oblique views reveal no definite fracture or dislocation.  There is narrowing of medial compartment space.  Scattered osteophyte formation is seen.     Impression:     1. No acute osseous defect identified  2. Osteoarthritis  3. MR examination would allow further evaluation if clinically indicated        Electronically signed by: Regino Iraheta  Date:                                            01/17/2023  Time:                                           14:50      Radiographs of the right knee reviewed showing osteoarthritis changes with joint space narrowing and osteophyte formation.          1. Primary osteoarthritis of right knee              Plan:            Imaging and exam findings discussed with the patient.  She has persistent right knee pain.  She has tried steroid  injection as well as viscosupplementation with no significant relief.  She has failed nonoperative management and is interested in total knee arthroplasty.  Refer to Dr. Arriaga for surgical evaluation for total knee arthroplasty.  Follow up with me as needed.  She was counseled again on weight loss and smoking cessation. All questions were answered and she was in agreement with the plan.

## 2023-10-05 ENCOUNTER — OFFICE VISIT (OUTPATIENT)
Dept: ORTHOPEDICS | Facility: CLINIC | Age: 58
End: 2023-10-05
Payer: MEDICARE

## 2023-10-05 VITALS
HEART RATE: 61 BPM | BODY MASS INDEX: 35.36 KG/M2 | SYSTOLIC BLOOD PRESSURE: 131 MMHG | WEIGHT: 220 LBS | HEIGHT: 66 IN | DIASTOLIC BLOOD PRESSURE: 67 MMHG

## 2023-10-05 DIAGNOSIS — M17.11 PRIMARY OSTEOARTHRITIS OF RIGHT KNEE: Primary | ICD-10-CM

## 2023-10-05 PROCEDURE — 99214 PR OFFICE/OUTPT VISIT, EST, LEVL IV, 30-39 MIN: ICD-10-PCS | Mod: ,,, | Performed by: ORTHOPAEDIC SURGERY

## 2023-10-05 PROCEDURE — 3075F SYST BP GE 130 - 139MM HG: CPT | Mod: CPTII,,, | Performed by: ORTHOPAEDIC SURGERY

## 2023-10-05 PROCEDURE — 1159F MED LIST DOCD IN RCRD: CPT | Mod: CPTII,,, | Performed by: ORTHOPAEDIC SURGERY

## 2023-10-05 PROCEDURE — 3078F PR MOST RECENT DIASTOLIC BLOOD PRESSURE < 80 MM HG: ICD-10-PCS | Mod: CPTII,,, | Performed by: ORTHOPAEDIC SURGERY

## 2023-10-05 PROCEDURE — 3008F PR BODY MASS INDEX (BMI) DOCUMENTED: ICD-10-PCS | Mod: CPTII,,, | Performed by: ORTHOPAEDIC SURGERY

## 2023-10-05 PROCEDURE — 99214 OFFICE O/P EST MOD 30 MIN: CPT | Mod: ,,, | Performed by: ORTHOPAEDIC SURGERY

## 2023-10-05 PROCEDURE — 3075F PR MOST RECENT SYSTOLIC BLOOD PRESS GE 130-139MM HG: ICD-10-PCS | Mod: CPTII,,, | Performed by: ORTHOPAEDIC SURGERY

## 2023-10-05 PROCEDURE — 3008F BODY MASS INDEX DOCD: CPT | Mod: CPTII,,, | Performed by: ORTHOPAEDIC SURGERY

## 2023-10-05 PROCEDURE — 3078F DIAST BP <80 MM HG: CPT | Mod: CPTII,,, | Performed by: ORTHOPAEDIC SURGERY

## 2023-10-05 PROCEDURE — 1159F PR MEDICATION LIST DOCUMENTED IN MEDICAL RECORD: ICD-10-PCS | Mod: CPTII,,, | Performed by: ORTHOPAEDIC SURGERY

## 2023-10-05 NOTE — PROGRESS NOTES
Chief Complaint:   Chief Complaint   Patient presents with    Right Knee - Pain     Ref by dr palm for Rt knee pain, states has been going on for awhile, states wakes her up at night, states taking otc meds like tylenol for relief, pt states also using a cream, will ice knee as well, states nothing helps,        History of present illness:  58-year-old female presents for evaluation of right knee pain.  Patient has longstanding right knee pain.  We will set treated.  Improved by rest.  She is tried anti-inflammatories.  She is tried multiple injections including steroids as well as viscosupplementation.  This is all failed to relieve her symptoms.  Patient continues to have significant severe pain to the right knee.  Worse with activity improved by rest.  She feels as though she is reached a point disability like to discuss further treatment options.    Past Medical History:   Diagnosis Date    Arthritis     COPD (chronic obstructive pulmonary disease)     Osteoarthritis        Past Surgical History:   Procedure Laterality Date    BACK SURGERY      CARPAL TUNNEL RELEASE Bilateral     CHOLECYSTECTOMY      KNEE SURGERY      NECK FUSION      SHOULDER SURGERY         Current Outpatient Medications   Medication Sig    albuterol (PROVENTIL) 2.5 mg /3 mL (0.083 %) nebulizer solution albuterol sulfate 2.5 mg/3 mL (0.083 %) solution for nebulization   use as needed    albuterol (PROVENTIL/VENTOLIN HFA) 90 mcg/actuation inhaler Inhale 2 puffs into the lungs.    celecoxib (CELEBREX) 200 MG capsule Take 200 mg by mouth.    cyclobenzaprine (FLEXERIL) 10 MG tablet Take 10 mg by mouth.    DULoxetine (CYMBALTA) 60 MG capsule Take 60 mg by mouth.    esomeprazole (NEXIUM) 40 MG capsule esomeprazole magnesium 40 mg capsule,delayed release    LIDOcaine (LIDODERM) 5 % lidocaine 5 % topical patch    LYRICA 100 mg capsule Take 100 mg by mouth 3 (three) times daily.    pantoprazole (PROTONIX) 40 MG tablet Take 40 mg by mouth every  morning.    TRELEGY ELLIPTA 200-62.5-25 mcg inhaler Inhale 1 puff into the lungs.     No current facility-administered medications for this visit.       Review of patient's allergies indicates:  No Known Allergies    Family History   Family history unknown: Yes       Social History     Socioeconomic History    Marital status:    Tobacco Use    Smoking status: Every Day     Current packs/day: 1.00     Types: Cigarettes    Smokeless tobacco: Never   Substance and Sexual Activity    Alcohol use: Yes     Alcohol/week: 2.0 standard drinks of alcohol     Types: 1 Glasses of wine, 1 Cans of beer per week     Comment: Occasionally    Drug use: Never    Sexual activity: Yes     Partners: Male           Review of Systems:    Constitution: Negative for chills, fever, and sweats.  Negative for unexplained weight loss.    HENT:  Negative for headaches and blurry vision.    Cardiovascular:Negative for chest pain or irregular heart beat. Negative for hypertension.    Respiratory:  Negative for cough and shortness of breath.    Gastrointestinal: Negative for abdominal pain, heartburn, melena, nausea, and vomitting.    Genitourinary:  Negative bladder incontinence and dysuria.    Musculoskeletal:  See HPI    Neurological: Negative for numbness.    Psychiatric/Behavioral: Negative for depression.  The patient is not nervous/anxious.      Endocrine: Negative for polyuria    Hematologic/Lymphatic: Negative for bleeding problem.  Does not bruise/bleed easily.    Skin: Negative for poor would healing and rash      Physical Examination:    Vital Signs:    Vitals:    10/05/23 1409   BP: 131/67   Pulse: 61       Body mass index is 35.51 kg/m².    General: No acute distress, alert and oriented, healthy appearing    HEENT: Head is atraumatic, mucous membranes are moist    Neck: Supples, no JVD    Cardiovascular: Palpable dorsalis pedis and posterior tibial pulses, regular rate and rhythm to those pulses    Lungs: Breathing  non-labored    Skin: no rashes appreciated    Neurologic: Can flex and extend knees, ankles, and toes. Sensation is grossly intact    Right knee:  Patient has significant crepitus range of motion right knee.  Brisk cap refill disappeared states that disappeared patient get full extension.  Flexion of 105.  She has a valgus alignment that is correctable     X-rays:  X-rays of the right knee reviewed.  Patient end-stage arthritis with loss of joint space and bone-on-bone articulation of the medial, lateral, patellofemoral joints     Assessment::  Right knee osteoarthritis    Plan:  At this point the patient is tried and failed all conservative management with regards to their knee. They have tried and failed nonoperative management including: Anti-inflammatories, activity modification, injections. All of these have failed to completely remove their pain. They have pain going up and down stairs as well as walking on level ground. The knee pain is affecting activities of daily living They feel that theyve reached a point of disability with regards to their knee. X-rays reveal advanced, end-stage degenerative osteoarthritis as noted by subchondral sclerosis, joint space narrowing in the medial, lateral and patellofemoral compartment, and periarticular osteophytes. The patient would like to proceed with surgical intervention and would be a good candidate for total knee replacement.    Total knee arthroplasty procedure, alternatives, risks, and benefits were discussed in detail. The risks including but not limited to: infection, need for revision surgery, pain, swelling, loosening, injury to surrounding neurovascular structures, stiffness, incomplete resolution of pain, DVT, PE, and death were discussed in detail. Despite these risks, the patient would like to proceed with surgical intervention. All questions were answered, no guarantees made. Will plan for right TKA on late November.    Patient's minimal medical  comorbidities.  Likely spend 24 hours in the hospital.  Unlikely to be same-day discharge    This note was created using [employer] voice recognition software that occasionally misinterpreted phrases or words.    Consult note is delivered via Epic messaging service.

## 2023-12-21 ENCOUNTER — HOSPITAL ENCOUNTER (OUTPATIENT)
Dept: RADIOLOGY | Facility: HOSPITAL | Age: 58
Discharge: HOME | End: 2023-12-21
Attending: NURSE PRACTITIONER
Payer: MEDICARE

## 2023-12-21 ENCOUNTER — OFFICE VISIT (OUTPATIENT)
Dept: ORTHOPEDICS | Facility: CLINIC | Age: 58
End: 2023-12-21
Payer: MEDICARE

## 2023-12-21 VITALS
HEART RATE: 78 BPM | WEIGHT: 220 LBS | HEIGHT: 66 IN | BODY MASS INDEX: 35.36 KG/M2 | SYSTOLIC BLOOD PRESSURE: 125 MMHG | DIASTOLIC BLOOD PRESSURE: 65 MMHG

## 2023-12-21 DIAGNOSIS — M17.11 PRIMARY OSTEOARTHRITIS OF RIGHT KNEE: ICD-10-CM

## 2023-12-21 DIAGNOSIS — R79.9 ABNORMAL BLOOD CHEMISTRY LEVEL: ICD-10-CM

## 2023-12-21 DIAGNOSIS — M19.90 OSTEOARTHRITIS: ICD-10-CM

## 2023-12-21 DIAGNOSIS — M17.11 PRIMARY OSTEOARTHRITIS OF RIGHT KNEE: Primary | ICD-10-CM

## 2023-12-21 DIAGNOSIS — Z01.818 PREOPERATIVE TESTING: ICD-10-CM

## 2023-12-21 LAB — MRSA PCR SCRN (OHS): NOT DETECTED

## 2023-12-21 PROCEDURE — 3008F BODY MASS INDEX DOCD: CPT | Mod: CPTII,,, | Performed by: NURSE PRACTITIONER

## 2023-12-21 PROCEDURE — 3074F PR MOST RECENT SYSTOLIC BLOOD PRESSURE < 130 MM HG: ICD-10-PCS | Mod: CPTII,,, | Performed by: NURSE PRACTITIONER

## 2023-12-21 PROCEDURE — 3078F PR MOST RECENT DIASTOLIC BLOOD PRESSURE < 80 MM HG: ICD-10-PCS | Mod: CPTII,,, | Performed by: NURSE PRACTITIONER

## 2023-12-21 PROCEDURE — 3074F SYST BP LT 130 MM HG: CPT | Mod: CPTII,,, | Performed by: NURSE PRACTITIONER

## 2023-12-21 PROCEDURE — 3078F DIAST BP <80 MM HG: CPT | Mod: CPTII,,, | Performed by: NURSE PRACTITIONER

## 2023-12-21 PROCEDURE — 3008F PR BODY MASS INDEX (BMI) DOCUMENTED: ICD-10-PCS | Mod: CPTII,,, | Performed by: NURSE PRACTITIONER

## 2023-12-21 PROCEDURE — 1159F PR MEDICATION LIST DOCUMENTED IN MEDICAL RECORD: ICD-10-PCS | Mod: CPTII,,, | Performed by: NURSE PRACTITIONER

## 2023-12-21 PROCEDURE — 99214 PR OFFICE/OUTPT VISIT, EST, LEVL IV, 30-39 MIN: ICD-10-PCS | Mod: ,,, | Performed by: NURSE PRACTITIONER

## 2023-12-21 PROCEDURE — 71046 X-RAY EXAM CHEST 2 VIEWS: CPT | Mod: TC

## 2023-12-21 PROCEDURE — 1159F MED LIST DOCD IN RCRD: CPT | Mod: CPTII,,, | Performed by: NURSE PRACTITIONER

## 2023-12-21 PROCEDURE — 99214 OFFICE O/P EST MOD 30 MIN: CPT | Mod: ,,, | Performed by: NURSE PRACTITIONER

## 2023-12-21 PROCEDURE — 73700 CT LOWER EXTREMITY W/O DYE: CPT | Mod: TC,RT

## 2023-12-21 RX ORDER — ONDANSETRON 4 MG/1
4 TABLET, ORALLY DISINTEGRATING ORAL
Status: CANCELLED | OUTPATIENT
Start: 2023-12-21

## 2023-12-21 RX ORDER — KETOROLAC TROMETHAMINE 10 MG/1
10 TABLET, FILM COATED ORAL
Status: CANCELLED | OUTPATIENT
Start: 2023-12-21 | End: 2023-12-21

## 2023-12-21 RX ORDER — ACETAMINOPHEN 500 MG
1000 TABLET ORAL
Status: CANCELLED | OUTPATIENT
Start: 2023-12-21

## 2023-12-21 RX ORDER — SODIUM CHLORIDE, SODIUM GLUCONATE, SODIUM ACETATE, POTASSIUM CHLORIDE AND MAGNESIUM CHLORIDE 30; 37; 368; 526; 502 MG/100ML; MG/100ML; MG/100ML; MG/100ML; MG/100ML
INJECTION, SOLUTION INTRAVENOUS CONTINUOUS
Status: CANCELLED | OUTPATIENT
Start: 2023-12-21

## 2023-12-21 RX ORDER — TRANEXAMIC ACID 650 MG/1
1950 TABLET ORAL
Status: CANCELLED | OUTPATIENT
Start: 2023-12-21 | End: 2023-12-21

## 2023-12-21 RX ORDER — SCOLOPAMINE TRANSDERMAL SYSTEM 1 MG/1
1 PATCH, EXTENDED RELEASE TRANSDERMAL ONCE AS NEEDED
Status: CANCELLED | OUTPATIENT
Start: 2023-12-21 | End: 2035-05-19

## 2023-12-21 RX ORDER — GABAPENTIN 100 MG/1
300 CAPSULE ORAL
Status: CANCELLED | OUTPATIENT
Start: 2023-12-21

## 2023-12-21 NOTE — H&P (VIEW-ONLY)
Chief Complaint:   Chief Complaint   Patient presents with    Right Knee - Pre-op Exam     Pre-op for right TKA 1/10/23       History of present illness:  58-year-old female presents for evaluation of right knee pain.  Patient has longstanding right knee pain.  We will set treated.  Improved by rest.  She is tried anti-inflammatories.  She is tried multiple injections including steroids as well as viscosupplementation.  This is all failed to relieve her symptoms.  Patient continues to have significant severe pain to the right knee.  Worse with activity improved by rest.  She feels as though she is reached a point disability like to proceed with a right total knee arthroplasty.    Past Medical History:   Diagnosis Date    Arthritis     COPD (chronic obstructive pulmonary disease)     Osteoarthritis        Past Surgical History:   Procedure Laterality Date    BACK SURGERY      CARPAL TUNNEL RELEASE Bilateral     CHOLECYSTECTOMY      KNEE SURGERY      NECK FUSION      SHOULDER SURGERY         Current Outpatient Medications   Medication Sig    albuterol (PROVENTIL) 2.5 mg /3 mL (0.083 %) nebulizer solution albuterol sulfate 2.5 mg/3 mL (0.083 %) solution for nebulization   use as needed    albuterol (PROVENTIL/VENTOLIN HFA) 90 mcg/actuation inhaler Inhale 2 puffs into the lungs.    celecoxib (CELEBREX) 200 MG capsule Take 200 mg by mouth.    cyclobenzaprine (FLEXERIL) 10 MG tablet Take 10 mg by mouth.    DULoxetine (CYMBALTA) 60 MG capsule Take 60 mg by mouth.    esomeprazole (NEXIUM) 40 MG capsule esomeprazole magnesium 40 mg capsule,delayed release    LIDOcaine (LIDODERM) 5 % lidocaine 5 % topical patch    LYRICA 100 mg capsule Take 100 mg by mouth 3 (three) times daily.    pantoprazole (PROTONIX) 40 MG tablet Take 40 mg by mouth every morning.    TRELEGY ELLIPTA 200-62.5-25 mcg inhaler Inhale 1 puff into the lungs.     No current facility-administered medications for this visit.       Review of patient's allergies  indicates:  No Known Allergies    Family History   Family history unknown: Yes       Social History     Socioeconomic History    Marital status:    Tobacco Use    Smoking status: Every Day     Current packs/day: 1.00     Types: Cigarettes    Smokeless tobacco: Never   Substance and Sexual Activity    Alcohol use: Yes     Alcohol/week: 2.0 standard drinks of alcohol     Types: 1 Glasses of wine, 1 Cans of beer per week     Comment: Occasionally    Drug use: Never    Sexual activity: Yes     Partners: Male           Review of Systems:    Constitution: Negative for chills, fever, and sweats.  Negative for unexplained weight loss.    HENT:  Negative for headaches and blurry vision.    Cardiovascular:Negative for chest pain or irregular heart beat. Negative for hypertension.    Respiratory:  Negative for cough and shortness of breath.    Gastrointestinal: Negative for abdominal pain, heartburn, melena, nausea, and vomitting.    Genitourinary:  Negative bladder incontinence and dysuria.    Musculoskeletal:  See HPI    Neurological: Negative for numbness.    Psychiatric/Behavioral: Negative for depression.  The patient is not nervous/anxious.      Endocrine: Negative for polyuria    Hematologic/Lymphatic: Negative for bleeding problem.  Does not bruise/bleed easily.    Skin: Negative for poor would healing and rash      Physical Examination:    Vital Signs:    Vitals:    12/21/23 0830   BP: 125/65   Pulse: 78       Body mass index is 35.51 kg/m².    General: No acute distress, alert and oriented, healthy appearing    HEENT: Head is atraumatic, mucous membranes are moist    Neck: Supples, no JVD    Cardiovascular: Palpable dorsalis pedis and posterior tibial pulses, regular rate and rhythm to those pulses    Lungs: Breathing non-labored    Skin: no rashes appreciated    Neurologic: Can flex and extend knees, ankles, and toes. Sensation is grossly intact    Right knee:  Patient has significant crepitus range of motion  right knee.  Brisk cap refill disappeared states that disappeared patient get full extension.  Flexion of 105.  She has a valgus alignment that is correctable          Assessment::  Right knee osteoarthritis    Plan:  At this point the patient is tried and failed all conservative management with regards to their knee. They have tried and failed nonoperative management including: Anti-inflammatories, activity modification, injections. All of these have failed to completely remove their pain. They have pain going up and down stairs as well as walking on level ground. The knee pain is affecting activities of daily living They feel that theyve reached a point of disability with regards to their knee. X-rays reveal advanced, end-stage degenerative osteoarthritis as noted by subchondral sclerosis, joint space narrowing in the medial, lateral and patellofemoral compartment, and periarticular osteophytes. The patient would like to proceed with surgical intervention and would be a good candidate for total knee replacement.    Total knee arthroplasty procedure, alternatives, risks, and benefits were discussed in detail. The risks including but not limited to: infection, need for revision surgery, pain, swelling, loosening, injury to surrounding neurovascular structures, stiffness, incomplete resolution of pain, DVT, PE, and death were discussed in detail. Despite these risks, the patient would like to proceed with surgical intervention. All questions were answered, no guarantees made. Will plan for right TKA on 01/10/2023.      This note was created using [employer] voice recognition software that occasionally misinterpreted phrases or words.    Consult note is delivered via Epic messaging service.

## 2023-12-21 NOTE — PROGRESS NOTES
Chief Complaint:   Chief Complaint   Patient presents with    Right Knee - Pre-op Exam     Pre-op for right TKA 1/10/23       History of present illness:  58-year-old female presents for evaluation of right knee pain.  Patient has longstanding right knee pain.  We will set treated.  Improved by rest.  She is tried anti-inflammatories.  She is tried multiple injections including steroids as well as viscosupplementation.  This is all failed to relieve her symptoms.  Patient continues to have significant severe pain to the right knee.  Worse with activity improved by rest.  She feels as though she is reached a point disability like to proceed with a right total knee arthroplasty.    Past Medical History:   Diagnosis Date    Arthritis     COPD (chronic obstructive pulmonary disease)     Osteoarthritis        Past Surgical History:   Procedure Laterality Date    BACK SURGERY      CARPAL TUNNEL RELEASE Bilateral     CHOLECYSTECTOMY      KNEE SURGERY      NECK FUSION      SHOULDER SURGERY         Current Outpatient Medications   Medication Sig    albuterol (PROVENTIL) 2.5 mg /3 mL (0.083 %) nebulizer solution albuterol sulfate 2.5 mg/3 mL (0.083 %) solution for nebulization   use as needed    albuterol (PROVENTIL/VENTOLIN HFA) 90 mcg/actuation inhaler Inhale 2 puffs into the lungs.    celecoxib (CELEBREX) 200 MG capsule Take 200 mg by mouth.    cyclobenzaprine (FLEXERIL) 10 MG tablet Take 10 mg by mouth.    DULoxetine (CYMBALTA) 60 MG capsule Take 60 mg by mouth.    esomeprazole (NEXIUM) 40 MG capsule esomeprazole magnesium 40 mg capsule,delayed release    LIDOcaine (LIDODERM) 5 % lidocaine 5 % topical patch    LYRICA 100 mg capsule Take 100 mg by mouth 3 (three) times daily.    pantoprazole (PROTONIX) 40 MG tablet Take 40 mg by mouth every morning.    TRELEGY ELLIPTA 200-62.5-25 mcg inhaler Inhale 1 puff into the lungs.     No current facility-administered medications for this visit.       Review of patient's allergies  indicates:  No Known Allergies    Family History   Family history unknown: Yes       Social History     Socioeconomic History    Marital status:    Tobacco Use    Smoking status: Every Day     Current packs/day: 1.00     Types: Cigarettes    Smokeless tobacco: Never   Substance and Sexual Activity    Alcohol use: Yes     Alcohol/week: 2.0 standard drinks of alcohol     Types: 1 Glasses of wine, 1 Cans of beer per week     Comment: Occasionally    Drug use: Never    Sexual activity: Yes     Partners: Male           Review of Systems:    Constitution: Negative for chills, fever, and sweats.  Negative for unexplained weight loss.    HENT:  Negative for headaches and blurry vision.    Cardiovascular:Negative for chest pain or irregular heart beat. Negative for hypertension.    Respiratory:  Negative for cough and shortness of breath.    Gastrointestinal: Negative for abdominal pain, heartburn, melena, nausea, and vomitting.    Genitourinary:  Negative bladder incontinence and dysuria.    Musculoskeletal:  See HPI    Neurological: Negative for numbness.    Psychiatric/Behavioral: Negative for depression.  The patient is not nervous/anxious.      Endocrine: Negative for polyuria    Hematologic/Lymphatic: Negative for bleeding problem.  Does not bruise/bleed easily.    Skin: Negative for poor would healing and rash      Physical Examination:    Vital Signs:    Vitals:    12/21/23 0830   BP: 125/65   Pulse: 78       Body mass index is 35.51 kg/m².    General: No acute distress, alert and oriented, healthy appearing    HEENT: Head is atraumatic, mucous membranes are moist    Neck: Supples, no JVD    Cardiovascular: Palpable dorsalis pedis and posterior tibial pulses, regular rate and rhythm to those pulses    Lungs: Breathing non-labored    Skin: no rashes appreciated    Neurologic: Can flex and extend knees, ankles, and toes. Sensation is grossly intact    Right knee:  Patient has significant crepitus range of motion  right knee.  Brisk cap refill disappeared states that disappeared patient get full extension.  Flexion of 105.  She has a valgus alignment that is correctable          Assessment::  Right knee osteoarthritis    Plan:  At this point the patient is tried and failed all conservative management with regards to their knee. They have tried and failed nonoperative management including: Anti-inflammatories, activity modification, injections. All of these have failed to completely remove their pain. They have pain going up and down stairs as well as walking on level ground. The knee pain is affecting activities of daily living They feel that theyve reached a point of disability with regards to their knee. X-rays reveal advanced, end-stage degenerative osteoarthritis as noted by subchondral sclerosis, joint space narrowing in the medial, lateral and patellofemoral compartment, and periarticular osteophytes. The patient would like to proceed with surgical intervention and would be a good candidate for total knee replacement.    Total knee arthroplasty procedure, alternatives, risks, and benefits were discussed in detail. The risks including but not limited to: infection, need for revision surgery, pain, swelling, loosening, injury to surrounding neurovascular structures, stiffness, incomplete resolution of pain, DVT, PE, and death were discussed in detail. Despite these risks, the patient would like to proceed with surgical intervention. All questions were answered, no guarantees made. Will plan for right TKA on 01/10/2023.      This note was created using [email or web address] voice recognition software that occasionally misinterpreted phrases or words.    Consult note is delivered via Epic messaging service.

## 2024-01-04 ENCOUNTER — ANESTHESIA EVENT (OUTPATIENT)
Dept: SURGERY | Facility: HOSPITAL | Age: 59
End: 2024-01-04
Payer: MEDICARE

## 2024-01-09 RX ORDER — SODIUM CHLORIDE, SODIUM GLUCONATE, SODIUM ACETATE, POTASSIUM CHLORIDE AND MAGNESIUM CHLORIDE 30; 37; 368; 526; 502 MG/100ML; MG/100ML; MG/100ML; MG/100ML; MG/100ML
INJECTION, SOLUTION INTRAVENOUS CONTINUOUS
Status: CANCELLED | OUTPATIENT
Start: 2024-01-09 | End: 2024-02-08

## 2024-01-09 RX ORDER — HYDROCODONE BITARTRATE AND ACETAMINOPHEN 5; 325 MG/1; MG/1
1 TABLET ORAL
Status: CANCELLED | OUTPATIENT
Start: 2024-01-09

## 2024-01-09 NOTE — ANESTHESIA PREPROCEDURE EVALUATION
01/09/2024  Yaneth Deluna is a 58 y.o., female with ----------------------------  Arthritis  COPD (chronic obstructive pulmonary disease)  Osteoarthritis    And ----------------------------  Back surgery  Carpal tunnel release  Cholecystectomy  Knee surgery  Neck fusion    Presents for right robotic TKA       Pre-op Assessment    I have reviewed the NPO Status.      Review of Systems  Pulmonary:   COPD         Chronic Obstructive Pulmonary Disease (COPD):                      Musculoskeletal:  Arthritis        Arthritis          Neurological:      Arthritis                              Latest Reference Range & Units 12/21/23 09:42   Hemoglobin 12.0 - 16.0 g/dL 15.8   Hematocrit 37.0 - 47.0 % 48.7 (H)   Platelet Count 130 - 400 x10(3)/mcL 244   Sodium 136 - 145 mmol/L 140   Potassium 3.5 - 5.1 mmol/L 4.5   Chloride 98 - 107 mmol/L 104   CO2 22 - 29 mmol/L 26   BUN 9.8 - 20.1 mg/dL 18.9   Creatinine 0.55 - 1.02 mg/dL 0.87   eGFR mls/min/1.73/m2 >60   Glucose 74 - 100 mg/dL 110 (H)   Hemoglobin A1C External <=7.0 % 5.4   Estimated Avg Glucose mg/dL 108.3   (H): Data is abnormally high    Physical Exam  General: Well nourished, Cooperative, Alert and Oriented    Airway:  Mallampati: II   Mouth Opening: Normal  TM Distance: Normal  Tongue: Normal  Neck ROM: Normal ROM    Dental:  Edentulous  Top edentulous bottom caps  Chest/Lungs:  Clear to auscultation, Normal Respiratory Rate  Smokers cough  fine ronchi clear with cough  Heart:  Rate: Normal  Rhythm: Regular Rhythm        Anesthesia Plan  Type of Anesthesia, risks & benefits discussed:    Anesthesia Type: Spinal  Intra-op Monitoring Plan: Standard ASA Monitors  Post Op Pain Control Plan: multimodal analgesia  Informed Consent: Patient consented to blood products? Yes  ASA Score: 3  Day of Surgery Review of History & Physical: H&P Update referred to the  surgeon/provider.  Anesthesia Plan Notes: ERAS ordered by surgeon according to [facility] protocol  Goal directed IV Fluid therapy  No castro necessary unless hx of BPH/urinary retention   In PACU will perform postop pain adductor canal block for postop pain control with Bupiv 0.25% with Decadron  as requested by Dr. Arriaga    Ready For Surgery From Anesthesia Perspective.     .

## 2024-01-10 ENCOUNTER — HOSPITAL ENCOUNTER (OUTPATIENT)
Facility: HOSPITAL | Age: 59
LOS: 1 days | Discharge: HOME | End: 2024-01-11
Attending: ORTHOPAEDIC SURGERY | Admitting: ORTHOPAEDIC SURGERY
Payer: MEDICARE

## 2024-01-10 ENCOUNTER — ANESTHESIA (OUTPATIENT)
Dept: SURGERY | Facility: HOSPITAL | Age: 59
End: 2024-01-10
Payer: MEDICARE

## 2024-01-10 DIAGNOSIS — M17.11 PRIMARY OSTEOARTHRITIS OF RIGHT KNEE: ICD-10-CM

## 2024-01-10 DIAGNOSIS — Z01.818 PREOPERATIVE TESTING: ICD-10-CM

## 2024-01-10 DIAGNOSIS — M19.90 OSTEOARTHRITIS: ICD-10-CM

## 2024-01-10 DIAGNOSIS — R79.9 ABNORMAL BLOOD CHEMISTRY LEVEL: ICD-10-CM

## 2024-01-10 LAB
HCT VFR BLD AUTO: 39.7 % (ref 37–47)
HGB BLD-MCNC: 12.8 G/DL (ref 12–16)
POCT GLUCOSE: 101 MG/DL (ref 70–110)

## 2024-01-10 PROCEDURE — 63600175 PHARM REV CODE 636 W HCPCS: Performed by: ORTHOPAEDIC SURGERY

## 2024-01-10 PROCEDURE — 27201423 OPTIME MED/SURG SUP & DEVICES STERILE SUPPLY: Performed by: ORTHOPAEDIC SURGERY

## 2024-01-10 PROCEDURE — 85018 HEMOGLOBIN: CPT | Performed by: ORTHOPAEDIC SURGERY

## 2024-01-10 PROCEDURE — 37000008 HC ANESTHESIA 1ST 15 MINUTES: Performed by: ORTHOPAEDIC SURGERY

## 2024-01-10 PROCEDURE — 94799 UNLISTED PULMONARY SVC/PX: CPT | Mod: XB

## 2024-01-10 PROCEDURE — 51798 US URINE CAPACITY MEASURE: CPT | Performed by: ORTHOPAEDIC SURGERY

## 2024-01-10 PROCEDURE — D9220A PRA ANESTHESIA: Mod: CRNA,,, | Performed by: NURSE ANESTHETIST, CERTIFIED REGISTERED

## 2024-01-10 PROCEDURE — 25000003 PHARM REV CODE 250: Performed by: NURSE PRACTITIONER

## 2024-01-10 PROCEDURE — 99900035 HC TECH TIME PER 15 MIN (STAT)

## 2024-01-10 PROCEDURE — 27447 TOTAL KNEE ARTHROPLASTY: CPT | Mod: AS,RT,, | Performed by: NURSE PRACTITIONER

## 2024-01-10 PROCEDURE — 27447 TOTAL KNEE ARTHROPLASTY: CPT | Mod: RT,,, | Performed by: ORTHOPAEDIC SURGERY

## 2024-01-10 PROCEDURE — 64447 NJX AA&/STRD FEMORAL NRV IMG: CPT | Mod: 59,RT | Performed by: ANESTHESIOLOGY

## 2024-01-10 PROCEDURE — 36000712 HC OR TIME LEV V 1ST 15 MIN: Performed by: ORTHOPAEDIC SURGERY

## 2024-01-10 PROCEDURE — 82962 GLUCOSE BLOOD TEST: CPT | Performed by: ORTHOPAEDIC SURGERY

## 2024-01-10 PROCEDURE — G0378 HOSPITAL OBSERVATION PER HR: HCPCS

## 2024-01-10 PROCEDURE — 96365 THER/PROPH/DIAG IV INF INIT: CPT | Mod: 59

## 2024-01-10 PROCEDURE — D9220A PRA ANESTHESIA: Mod: ANES,,, | Performed by: ANESTHESIOLOGY

## 2024-01-10 PROCEDURE — 36000713 HC OR TIME LEV V EA ADD 15 MIN: Performed by: ORTHOPAEDIC SURGERY

## 2024-01-10 PROCEDURE — 25000003 PHARM REV CODE 250: Performed by: ORTHOPAEDIC SURGERY

## 2024-01-10 PROCEDURE — 25000003 PHARM REV CODE 250: Performed by: NURSE ANESTHETIST, CERTIFIED REGISTERED

## 2024-01-10 PROCEDURE — 97162 PT EVAL MOD COMPLEX 30 MIN: CPT

## 2024-01-10 PROCEDURE — C1776 JOINT DEVICE (IMPLANTABLE): HCPCS | Performed by: ORTHOPAEDIC SURGERY

## 2024-01-10 PROCEDURE — 88305 TISSUE EXAM BY PATHOLOGIST: CPT | Performed by: ORTHOPAEDIC SURGERY

## 2024-01-10 PROCEDURE — C1713 ANCHOR/SCREW BN/BN,TIS/BN: HCPCS | Performed by: ORTHOPAEDIC SURGERY

## 2024-01-10 PROCEDURE — 63600175 PHARM REV CODE 636 W HCPCS: Performed by: NURSE PRACTITIONER

## 2024-01-10 PROCEDURE — 71000033 HC RECOVERY, INTIAL HOUR: Performed by: ORTHOPAEDIC SURGERY

## 2024-01-10 PROCEDURE — 63600175 PHARM REV CODE 636 W HCPCS: Performed by: NURSE ANESTHETIST, CERTIFIED REGISTERED

## 2024-01-10 PROCEDURE — 88311 DECALCIFY TISSUE: CPT

## 2024-01-10 PROCEDURE — 96375 TX/PRO/DX INJ NEW DRUG ADDON: CPT | Mod: 59

## 2024-01-10 PROCEDURE — 0055T BONE SRGRY CMPTR CT/MRI IMAG: CPT | Mod: ,,, | Performed by: ORTHOPAEDIC SURGERY

## 2024-01-10 PROCEDURE — 63600175 PHARM REV CODE 636 W HCPCS: Mod: JZ,JG | Performed by: ANESTHESIOLOGY

## 2024-01-10 PROCEDURE — A4216 STERILE WATER/SALINE, 10 ML: HCPCS | Performed by: ORTHOPAEDIC SURGERY

## 2024-01-10 PROCEDURE — 64447 NJX AA&/STRD FEMORAL NRV IMG: CPT | Mod: 59,RT,, | Performed by: ANESTHESIOLOGY

## 2024-01-10 PROCEDURE — 96366 THER/PROPH/DIAG IV INF ADDON: CPT | Mod: 59

## 2024-01-10 PROCEDURE — 37000009 HC ANESTHESIA EA ADD 15 MINS: Performed by: ORTHOPAEDIC SURGERY

## 2024-01-10 DEVICE — TIBIAL COMPONENT
Type: IMPLANTABLE DEVICE | Site: KNEE | Status: FUNCTIONAL
Brand: TRIATHLON

## 2024-01-10 DEVICE — CRUCIATE RETAINING FEMORAL
Type: IMPLANTABLE DEVICE | Site: KNEE | Status: FUNCTIONAL
Brand: TRIATHLON

## 2024-01-10 DEVICE — TIBIAL BEARING INSERT - CS
Type: IMPLANTABLE DEVICE | Site: KNEE | Status: FUNCTIONAL
Brand: TRIATHLON

## 2024-01-10 RX ORDER — DEXAMETHASONE SODIUM PHOSPHATE 4 MG/ML
4 INJECTION, SOLUTION INTRA-ARTICULAR; INTRALESIONAL; INTRAMUSCULAR; INTRAVENOUS; SOFT TISSUE ONCE
Status: DISCONTINUED | OUTPATIENT
Start: 2024-01-10 | End: 2024-01-10

## 2024-01-10 RX ORDER — DEXAMETHASONE SODIUM PHOSPHATE 10 MG/ML
INJECTION INTRAMUSCULAR; INTRAVENOUS
Status: DISCONTINUED | OUTPATIENT
Start: 2024-01-10 | End: 2024-01-10

## 2024-01-10 RX ORDER — ACETAMINOPHEN 500 MG
1000 TABLET ORAL
Status: COMPLETED | OUTPATIENT
Start: 2024-01-10 | End: 2024-01-10

## 2024-01-10 RX ORDER — HYDROCODONE BITARTRATE AND ACETAMINOPHEN 5; 325 MG/1; MG/1
1 TABLET ORAL EVERY 4 HOURS PRN
Status: DISCONTINUED | OUTPATIENT
Start: 2024-01-10 | End: 2024-01-11

## 2024-01-10 RX ORDER — METOCLOPRAMIDE HYDROCHLORIDE 5 MG/ML
10 INJECTION INTRAMUSCULAR; INTRAVENOUS
Status: DISCONTINUED | OUTPATIENT
Start: 2024-01-10 | End: 2024-01-10

## 2024-01-10 RX ORDER — TRANEXAMIC ACID 650 MG/1
1950 TABLET ORAL
Status: COMPLETED | OUTPATIENT
Start: 2024-01-10 | End: 2024-01-10

## 2024-01-10 RX ORDER — HYDROMORPHONE HYDROCHLORIDE 2 MG/ML
0.4 INJECTION, SOLUTION INTRAMUSCULAR; INTRAVENOUS; SUBCUTANEOUS EVERY 5 MIN PRN
Status: DISCONTINUED | OUTPATIENT
Start: 2024-01-10 | End: 2024-01-10

## 2024-01-10 RX ORDER — MIDAZOLAM HYDROCHLORIDE 1 MG/ML
.5-4 INJECTION INTRAMUSCULAR; INTRAVENOUS
Status: DISCONTINUED | OUTPATIENT
Start: 2024-01-10 | End: 2024-01-10

## 2024-01-10 RX ORDER — FLUTICASONE FUROATE AND VILANTEROL 200; 25 UG/1; UG/1
1 POWDER RESPIRATORY (INHALATION) DAILY
Status: DISCONTINUED | OUTPATIENT
Start: 2024-01-10 | End: 2024-01-11 | Stop reason: HOSPADM

## 2024-01-10 RX ORDER — SODIUM CHLORIDE 0.9 % (FLUSH) 0.9 %
SYRINGE (ML) INJECTION
Status: DISPENSED
Start: 2024-01-10 | End: 2024-01-10

## 2024-01-10 RX ORDER — ONDANSETRON HYDROCHLORIDE 2 MG/ML
4 INJECTION, SOLUTION INTRAVENOUS EVERY 6 HOURS PRN
Status: DISCONTINUED | OUTPATIENT
Start: 2024-01-10 | End: 2024-01-11 | Stop reason: HOSPADM

## 2024-01-10 RX ORDER — TRAMADOL HYDROCHLORIDE 50 MG/1
50 TABLET ORAL EVERY 4 HOURS PRN
Status: DISCONTINUED | OUTPATIENT
Start: 2024-01-10 | End: 2024-01-11

## 2024-01-10 RX ORDER — SODIUM CHLORIDE 9 MG/ML
INJECTION, SOLUTION INTRAMUSCULAR; INTRAVENOUS; SUBCUTANEOUS
Status: DISCONTINUED | OUTPATIENT
Start: 2024-01-10 | End: 2024-01-10 | Stop reason: HOSPADM

## 2024-01-10 RX ORDER — BUPIVACAINE HYDROCHLORIDE 7.5 MG/ML
INJECTION, SOLUTION EPIDURAL; RETROBULBAR
Status: COMPLETED | OUTPATIENT
Start: 2024-01-10 | End: 2024-01-10

## 2024-01-10 RX ORDER — DOCUSATE SODIUM 100 MG/1
200 CAPSULE, LIQUID FILLED ORAL DAILY
Status: DISCONTINUED | OUTPATIENT
Start: 2024-01-11 | End: 2024-01-11 | Stop reason: HOSPADM

## 2024-01-10 RX ORDER — POLYETHYLENE GLYCOL 3350 17 G/17G
17 POWDER, FOR SOLUTION ORAL NIGHTLY
Status: DISCONTINUED | OUTPATIENT
Start: 2024-01-10 | End: 2024-01-11 | Stop reason: HOSPADM

## 2024-01-10 RX ORDER — SODIUM CHLORIDE, SODIUM LACTATE, POTASSIUM CHLORIDE, CALCIUM CHLORIDE 600; 310; 30; 20 MG/100ML; MG/100ML; MG/100ML; MG/100ML
INJECTION, SOLUTION INTRAVENOUS CONTINUOUS
Status: DISCONTINUED | OUTPATIENT
Start: 2024-01-10 | End: 2024-01-10

## 2024-01-10 RX ORDER — FAMOTIDINE 20 MG/1
20 TABLET, FILM COATED ORAL 2 TIMES DAILY
Status: DISCONTINUED | OUTPATIENT
Start: 2024-01-10 | End: 2024-01-11 | Stop reason: HOSPADM

## 2024-01-10 RX ORDER — LIDOCAINE HYDROCHLORIDE 10 MG/ML
INJECTION, SOLUTION EPIDURAL; INFILTRATION; INTRACAUDAL; PERINEURAL
Status: DISCONTINUED | OUTPATIENT
Start: 2024-01-10 | End: 2024-01-10

## 2024-01-10 RX ORDER — EPINEPHRINE 1 MG/ML
INJECTION, SOLUTION, CONCENTRATE INTRAVENOUS
Status: DISCONTINUED | OUTPATIENT
Start: 2024-01-10 | End: 2024-01-10 | Stop reason: HOSPADM

## 2024-01-10 RX ORDER — BUPIVACAINE HYDROCHLORIDE 2.5 MG/ML
INJECTION, SOLUTION EPIDURAL; INFILTRATION; INTRACAUDAL
Status: DISCONTINUED | OUTPATIENT
Start: 2024-01-10 | End: 2024-01-10

## 2024-01-10 RX ORDER — PREGABALIN 50 MG/1
100 CAPSULE ORAL NIGHTLY
Status: DISCONTINUED | OUTPATIENT
Start: 2024-01-10 | End: 2024-01-11 | Stop reason: HOSPADM

## 2024-01-10 RX ORDER — EPINEPHRINE 1 MG/ML
INJECTION, SOLUTION, CONCENTRATE INTRAVENOUS
Status: DISPENSED
Start: 2024-01-10 | End: 2024-01-10

## 2024-01-10 RX ORDER — KETOROLAC TROMETHAMINE 30 MG/ML
INJECTION, SOLUTION INTRAMUSCULAR; INTRAVENOUS
Status: DISPENSED
Start: 2024-01-10 | End: 2024-01-10

## 2024-01-10 RX ORDER — BUPIVACAINE HYDROCHLORIDE 2.5 MG/ML
INJECTION, SOLUTION EPIDURAL; INFILTRATION; INTRACAUDAL
Status: COMPLETED
Start: 2024-01-10 | End: 2024-01-10

## 2024-01-10 RX ORDER — ONDANSETRON 4 MG/1
4 TABLET, ORALLY DISINTEGRATING ORAL
Status: COMPLETED | OUTPATIENT
Start: 2024-01-10 | End: 2024-01-10

## 2024-01-10 RX ORDER — PHENYLEPHRINE HCL IN 0.9% NACL 1 MG/10 ML
SYRINGE (ML) INTRAVENOUS
Status: DISCONTINUED | OUTPATIENT
Start: 2024-01-10 | End: 2024-01-10

## 2024-01-10 RX ORDER — AMOXICILLIN 250 MG
2 CAPSULE ORAL 2 TIMES DAILY
Status: DISCONTINUED | OUTPATIENT
Start: 2024-01-10 | End: 2024-01-11 | Stop reason: HOSPADM

## 2024-01-10 RX ORDER — ALBUTEROL SULFATE 90 UG/1
2 AEROSOL, METERED RESPIRATORY (INHALATION) EVERY 6 HOURS PRN
Status: DISCONTINUED | OUTPATIENT
Start: 2024-01-10 | End: 2024-01-11 | Stop reason: HOSPADM

## 2024-01-10 RX ORDER — MEPERIDINE HYDROCHLORIDE 25 MG/ML
6.25 INJECTION INTRAMUSCULAR; INTRAVENOUS; SUBCUTANEOUS ONCE AS NEEDED
Status: DISCONTINUED | OUTPATIENT
Start: 2024-01-10 | End: 2024-01-10

## 2024-01-10 RX ORDER — SODIUM CHLORIDE, SODIUM GLUCONATE, SODIUM ACETATE, POTASSIUM CHLORIDE AND MAGNESIUM CHLORIDE 30; 37; 368; 526; 502 MG/100ML; MG/100ML; MG/100ML; MG/100ML; MG/100ML
INJECTION, SOLUTION INTRAVENOUS CONTINUOUS
Status: DISCONTINUED | OUTPATIENT
Start: 2024-01-10 | End: 2024-01-10

## 2024-01-10 RX ORDER — SODIUM CHLORIDE 9 MG/ML
INJECTION, SOLUTION INTRAVENOUS CONTINUOUS
Status: DISCONTINUED | OUTPATIENT
Start: 2024-01-10 | End: 2024-01-11 | Stop reason: HOSPADM

## 2024-01-10 RX ORDER — NAPROXEN SODIUM 220 MG/1
81 TABLET, FILM COATED ORAL 2 TIMES DAILY
Status: DISCONTINUED | OUTPATIENT
Start: 2024-01-11 | End: 2024-01-11 | Stop reason: HOSPADM

## 2024-01-10 RX ORDER — GABAPENTIN 300 MG/1
300 CAPSULE ORAL
Status: COMPLETED | OUTPATIENT
Start: 2024-01-10 | End: 2024-01-10

## 2024-01-10 RX ORDER — GLYCOPYRROLATE 0.2 MG/ML
INJECTION INTRAMUSCULAR; INTRAVENOUS
Status: DISCONTINUED | OUTPATIENT
Start: 2024-01-10 | End: 2024-01-10

## 2024-01-10 RX ORDER — PROPOFOL 10 MG/ML
VIAL (ML) INTRAVENOUS
Status: DISCONTINUED | OUTPATIENT
Start: 2024-01-10 | End: 2024-01-10

## 2024-01-10 RX ORDER — MORPHINE SULFATE 10 MG/ML
INJECTION INTRAMUSCULAR; INTRAVENOUS; SUBCUTANEOUS
Status: DISCONTINUED | OUTPATIENT
Start: 2024-01-10 | End: 2024-01-10 | Stop reason: HOSPADM

## 2024-01-10 RX ORDER — BUPIVACAINE HYDROCHLORIDE 2.5 MG/ML
30 INJECTION, SOLUTION EPIDURAL; INFILTRATION; INTRACAUDAL ONCE
Status: DISCONTINUED | OUTPATIENT
Start: 2024-01-10 | End: 2024-01-10

## 2024-01-10 RX ORDER — EPHEDRINE SULFATE 50 MG/ML
INJECTION, SOLUTION INTRAVENOUS
Status: DISCONTINUED | OUTPATIENT
Start: 2024-01-10 | End: 2024-01-10

## 2024-01-10 RX ORDER — MORPHINE SULFATE 10 MG/ML
INJECTION INTRAMUSCULAR; INTRAVENOUS; SUBCUTANEOUS
Status: DISPENSED
Start: 2024-01-10 | End: 2024-01-10

## 2024-01-10 RX ORDER — MIDAZOLAM HYDROCHLORIDE 1 MG/ML
INJECTION INTRAMUSCULAR; INTRAVENOUS
Status: DISCONTINUED | OUTPATIENT
Start: 2024-01-10 | End: 2024-01-10

## 2024-01-10 RX ORDER — PROPOFOL 10 MG/ML
INJECTION, EMULSION INTRAVENOUS
Status: COMPLETED
Start: 2024-01-10 | End: 2024-01-10

## 2024-01-10 RX ORDER — BISACODYL 10 MG/1
10 SUPPOSITORY RECTAL DAILY
Status: DISCONTINUED | OUTPATIENT
Start: 2024-01-13 | End: 2024-01-11 | Stop reason: HOSPADM

## 2024-01-10 RX ORDER — LACTULOSE 10 G/15ML
20 SOLUTION ORAL EVERY 6 HOURS PRN
Status: DISCONTINUED | OUTPATIENT
Start: 2024-01-10 | End: 2024-01-11 | Stop reason: HOSPADM

## 2024-01-10 RX ORDER — METOCLOPRAMIDE 10 MG/1
10 TABLET ORAL
Status: DISCONTINUED | OUTPATIENT
Start: 2024-01-10 | End: 2024-01-11 | Stop reason: HOSPADM

## 2024-01-10 RX ORDER — DULOXETIN HYDROCHLORIDE 30 MG/1
60 CAPSULE, DELAYED RELEASE ORAL 2 TIMES DAILY
Status: DISCONTINUED | OUTPATIENT
Start: 2024-01-10 | End: 2024-01-11 | Stop reason: HOSPADM

## 2024-01-10 RX ORDER — KETOROLAC TROMETHAMINE 10 MG/1
10 TABLET, FILM COATED ORAL
Status: COMPLETED | OUTPATIENT
Start: 2024-01-10 | End: 2024-01-10

## 2024-01-10 RX ORDER — ONDANSETRON HYDROCHLORIDE 2 MG/ML
4 INJECTION, SOLUTION INTRAVENOUS DAILY PRN
Status: DISCONTINUED | OUTPATIENT
Start: 2024-01-10 | End: 2024-01-10

## 2024-01-10 RX ORDER — TALC
6 POWDER (GRAM) TOPICAL NIGHTLY PRN
Status: DISCONTINUED | OUTPATIENT
Start: 2024-01-10 | End: 2024-01-11 | Stop reason: HOSPADM

## 2024-01-10 RX ORDER — MORPHINE SULFATE 4 MG/ML
4 INJECTION, SOLUTION INTRAMUSCULAR; INTRAVENOUS
Status: DISCONTINUED | OUTPATIENT
Start: 2024-01-10 | End: 2024-01-11 | Stop reason: HOSPADM

## 2024-01-10 RX ORDER — METHOCARBAMOL 750 MG/1
750 TABLET, FILM COATED ORAL EVERY 8 HOURS PRN
Status: DISCONTINUED | OUTPATIENT
Start: 2024-01-10 | End: 2024-01-11 | Stop reason: HOSPADM

## 2024-01-10 RX ORDER — ACETAMINOPHEN 10 MG/ML
1000 INJECTION, SOLUTION INTRAVENOUS ONCE
Status: COMPLETED | OUTPATIENT
Start: 2024-01-10 | End: 2024-01-10

## 2024-01-10 RX ORDER — PROCHLORPERAZINE EDISYLATE 5 MG/ML
5 INJECTION INTRAMUSCULAR; INTRAVENOUS EVERY 30 MIN PRN
Status: ACTIVE | OUTPATIENT
Start: 2024-01-10

## 2024-01-10 RX ORDER — ROPIVACAINE HYDROCHLORIDE 5 MG/ML
INJECTION, SOLUTION EPIDURAL; INFILTRATION; PERINEURAL
Status: DISCONTINUED | OUTPATIENT
Start: 2024-01-10 | End: 2024-01-10 | Stop reason: HOSPADM

## 2024-01-10 RX ORDER — GABAPENTIN 300 MG/1
300 CAPSULE ORAL NIGHTLY
Status: DISCONTINUED | OUTPATIENT
Start: 2024-01-10 | End: 2024-01-10

## 2024-01-10 RX ORDER — SCOLOPAMINE TRANSDERMAL SYSTEM 1 MG/1
1 PATCH, EXTENDED RELEASE TRANSDERMAL ONCE AS NEEDED
Status: DISCONTINUED | OUTPATIENT
Start: 2024-01-10 | End: 2024-01-10 | Stop reason: HOSPADM

## 2024-01-10 RX ORDER — DEXAMETHASONE SODIUM PHOSPHATE 4 MG/ML
INJECTION, SOLUTION INTRA-ARTICULAR; INTRALESIONAL; INTRAMUSCULAR; INTRAVENOUS; SOFT TISSUE
Status: DISPENSED
Start: 2024-01-10 | End: 2024-01-10

## 2024-01-10 RX ORDER — ALUMINUM HYDROXIDE, MAGNESIUM HYDROXIDE, AND SIMETHICONE 1200; 120; 1200 MG/30ML; MG/30ML; MG/30ML
30 SUSPENSION ORAL EVERY 6 HOURS PRN
Status: DISCONTINUED | OUTPATIENT
Start: 2024-01-10 | End: 2024-01-11 | Stop reason: HOSPADM

## 2024-01-10 RX ORDER — ROPIVACAINE HYDROCHLORIDE 5 MG/ML
INJECTION, SOLUTION EPIDURAL; INFILTRATION; PERINEURAL
Status: DISPENSED
Start: 2024-01-10 | End: 2024-01-10

## 2024-01-10 RX ORDER — KETOROLAC TROMETHAMINE 10 MG/1
10 TABLET, FILM COATED ORAL EVERY 6 HOURS
Status: DISCONTINUED | OUTPATIENT
Start: 2024-01-10 | End: 2024-01-11 | Stop reason: HOSPADM

## 2024-01-10 RX ORDER — KETOROLAC TROMETHAMINE 30 MG/ML
INJECTION, SOLUTION INTRAMUSCULAR; INTRAVENOUS
Status: DISCONTINUED | OUTPATIENT
Start: 2024-01-10 | End: 2024-01-10 | Stop reason: HOSPADM

## 2024-01-10 RX ADMIN — METHOCARBAMOL 750 MG: 750 TABLET ORAL at 11:01

## 2024-01-10 RX ADMIN — MIDAZOLAM 2 MG: 1 INJECTION INTRAMUSCULAR; INTRAVENOUS at 09:01

## 2024-01-10 RX ADMIN — METOCLOPRAMIDE 10 MG: 5 INJECTION, SOLUTION INTRAMUSCULAR; INTRAVENOUS at 03:01

## 2024-01-10 RX ADMIN — KETOROLAC TROMETHAMINE 10 MG: 10 TABLET, FILM COATED ORAL at 11:01

## 2024-01-10 RX ADMIN — DULOXETINE HYDROCHLORIDE 60 MG: 30 CAPSULE, DELAYED RELEASE ORAL at 08:01

## 2024-01-10 RX ADMIN — EPHEDRINE SULFATE 10 MG: 50 INJECTION INTRAVENOUS at 10:01

## 2024-01-10 RX ADMIN — KETOROLAC TROMETHAMINE 10 MG: 10 TABLET, FILM COATED ORAL at 06:01

## 2024-01-10 RX ADMIN — TRAMADOL HYDROCHLORIDE 50 MG: 50 TABLET, COATED ORAL at 08:01

## 2024-01-10 RX ADMIN — CEFAZOLIN 2 G: 2 INJECTION, POWDER, FOR SOLUTION INTRAMUSCULAR; INTRAVENOUS at 09:01

## 2024-01-10 RX ADMIN — ACETAMINOPHEN 1000 MG: 1000 INJECTION INTRAVENOUS at 08:01

## 2024-01-10 RX ADMIN — EPHEDRINE SULFATE 15 MG: 50 INJECTION INTRAVENOUS at 10:01

## 2024-01-10 RX ADMIN — PREGABALIN 100 MG: 50 CAPSULE ORAL at 08:01

## 2024-01-10 RX ADMIN — SODIUM CHLORIDE: 9 INJECTION, SOLUTION INTRAVENOUS at 09:01

## 2024-01-10 RX ADMIN — ACETAMINOPHEN 1000 MG: 500 TABLET ORAL at 08:01

## 2024-01-10 RX ADMIN — METHOCARBAMOL 750 MG: 750 TABLET ORAL at 03:01

## 2024-01-10 RX ADMIN — LIDOCAINE HYDROCHLORIDE 50 MG: 10 INJECTION, SOLUTION EPIDURAL; INFILTRATION; INTRACAUDAL; PERINEURAL at 09:01

## 2024-01-10 RX ADMIN — SODIUM CHLORIDE, SODIUM GLUCONATE, SODIUM ACETATE, POTASSIUM CHLORIDE AND MAGNESIUM CHLORIDE: 526; 502; 368; 37; 30 INJECTION, SOLUTION INTRAVENOUS at 08:01

## 2024-01-10 RX ADMIN — DEXAMETHASONE SODIUM PHOSPHATE 4 MG: 10 INJECTION INTRAMUSCULAR; INTRAVENOUS at 11:01

## 2024-01-10 RX ADMIN — METOCLOPRAMIDE 10 MG: 10 TABLET ORAL at 08:01

## 2024-01-10 RX ADMIN — CEFAZOLIN 2 G: 2 INJECTION, POWDER, FOR SOLUTION INTRAMUSCULAR; INTRAVENOUS at 11:01

## 2024-01-10 RX ADMIN — GABAPENTIN 300 MG: 300 CAPSULE ORAL at 08:01

## 2024-01-10 RX ADMIN — GLYCOPYRROLATE 0.2 MG: 0.2 INJECTION INTRAMUSCULAR; INTRAVENOUS at 09:01

## 2024-01-10 RX ADMIN — Medication 100 MCG: at 10:01

## 2024-01-10 RX ADMIN — ONDANSETRON 4 MG: 4 TABLET, ORALLY DISINTEGRATING ORAL at 08:01

## 2024-01-10 RX ADMIN — PHENYLEPHRINE HYDROCHLORIDE 10 MCG/MIN: 10 INJECTION INTRAVENOUS at 09:01

## 2024-01-10 RX ADMIN — CEFAZOLIN 2 G: 2 INJECTION, POWDER, FOR SOLUTION INTRAMUSCULAR; INTRAVENOUS at 06:01

## 2024-01-10 RX ADMIN — BUPIVACAINE HYDROCHLORIDE 30 ML: 2.5 INJECTION, SOLUTION EPIDURAL; INFILTRATION; INTRACAUDAL; PERINEURAL at 11:01

## 2024-01-10 RX ADMIN — SODIUM CHLORIDE: 9 INJECTION, SOLUTION INTRAVENOUS at 12:01

## 2024-01-10 RX ADMIN — PROPOFOL 75 MCG/KG/MIN: 10 INJECTION, EMULSION INTRAVENOUS at 09:01

## 2024-01-10 RX ADMIN — BUPIVACAINE HYDROCHLORIDE 1.6 ML: 7.5 INJECTION, SOLUTION EPIDURAL; RETROBULBAR at 09:01

## 2024-01-10 RX ADMIN — KETOROLAC TROMETHAMINE 10 MG: 10 TABLET, FILM COATED ORAL at 08:01

## 2024-01-10 RX ADMIN — TRANEXAMIC ACID 1950 MG: 650 TABLET ORAL at 08:01

## 2024-01-10 RX ADMIN — SENNOSIDES AND DOCUSATE SODIUM 2 TABLET: 8.6; 5 TABLET ORAL at 08:01

## 2024-01-10 RX ADMIN — POLYETHYLENE GLYCOL 3350 17 G: 17 POWDER, FOR SOLUTION ORAL at 08:01

## 2024-01-10 RX ADMIN — FAMOTIDINE 20 MG: 20 TABLET ORAL at 08:01

## 2024-01-10 NOTE — PROGRESS NOTES
Patient experiencing a delayed response to anesthesia. Will convert to observation status, Will initiate our pre-emptive multimodal pain mgt protocol, provide post-anesthesia monitoring and perform frequent pain assessments. Will plan for discharge once the patient is tolerating pain and pain medications appropriately and the patient has been cleared from a safety/mobility standpoint.

## 2024-01-10 NOTE — TRANSFER OF CARE
Anesthesia Transfer of Care Note    Patient: Yaneth Deluna    Procedure(s) Performed: Procedure(s) (LRB):  ROBOTIC ARTHROPLASTY, KNEE, TOTAL (Right)    Patient location: PACU    Anesthesia Type: spinal    Transport from OR: Transported from OR on room air with adequate spontaneous ventilation    Post pain: adequate analgesia    Post assessment: no apparent anesthetic complications and tolerated procedure well    Post vital signs: stable    Level of consciousness: awake, alert and oriented    Nausea/Vomiting: no nausea/vomiting    Complications: none    Transfer of care protocol was followed

## 2024-01-10 NOTE — ANESTHESIA POSTPROCEDURE EVALUATION
Anesthesia Post Evaluation    Patient: Yaneth Deluna    Procedure(s) Performed: Procedure(s) (LRB):  ROBOTIC ARTHROPLASTY, KNEE, TOTAL (Right)    Final Anesthesia Type: spinal      Patient location during evaluation: PACU  Patient participation: Yes- Able to Participate  Level of consciousness: awake and alert  Post-procedure vital signs: reviewed and stable  Pain management: adequate (Adductor canal block in pacu)  Airway patency: patent    PONV status at discharge: No PONV  Anesthetic complications: no      Cardiovascular status: blood pressure returned to baseline and hemodynamically stable  Respiratory status: unassisted and spontaneous ventilation                Vitals Value Taken Time   /59 01/10/24 1231   Temp 36.4 °C (97.6 °F) 01/10/24 1200   Pulse 74 01/10/24 1231   Resp 14 01/10/24 1150   SpO2 92 % 01/10/24 1231   Vitals shown include unvalidated device data.      Event Time   Out of Recovery 11:52:29         Pain/Guille Score: Pain Rating Prior to Med Admin: 7 (1/10/2024  8:13 AM)  Guille Score: 9 (1/10/2024 11:50 AM)

## 2024-01-10 NOTE — ANESTHESIA PROCEDURE NOTES
Spinal    Diagnosis: Osteoarthritis  Patient location during procedure: OR  Start time: 1/10/2024 9:40 AM  Timeout: 1/10/2024 9:40 AM  End time: 1/10/2024 9:44 AM    Staffing  Authorizing Provider: Misty Gonzalez MD  Performing Provider: Misty Gonzalez MD    Staffing  Performed by: Misty Gonzalez MD  Authorized by: Misty Gonzalez MD    Preanesthetic Checklist  Completed: patient identified, IV checked, site marked, risks and benefits discussed, surgical consent, monitors and equipment checked, pre-op evaluation and timeout performed  Spinal Block  Patient position: sitting  Prep: ChloraPrep  Patient monitoring: heart rate, continuous pulse ox and frequent blood pressure checks  Approach: midline  Location: L3-4  Injection technique: single shot  CSF Fluid: clear free-flowing CSF  Needle  Needle type: pencil-tip   Needle length: 3.5 in  Additional Documentation: negative aspiration for heme, incremental injection and no paresthesia on injection  Needle localization: anatomical landmarks  Assessment  Ease of block: easy  Patient's tolerance of the procedure: comfortable throughout block and no complaints  Additional Notes  Additional 2mg IV versed given for total of 4mg   Medications:    Medications: bupivacaine (pf) (MARCAINE) injection 0.75% - Intraspinal   1.6 mL - 1/10/2024 9:44:00 AM

## 2024-01-10 NOTE — ANESTHESIA PROCEDURE NOTES
Peripheral Block    Patient location during procedure: post-op   Block not for primary anesthetic.  Reason for block: at surgeon's request and post-op pain management   Post-op Pain Location: Knee Right   Start time: 1/10/2024 11:29 AM  Timeout: 1/10/2024 11:27 AM   End time: 1/10/2024 11:30 AM    Staffing  Authorizing Provider: Misty Gonzalez MD  Performing Provider: Misty Gonzalez MD    Staffing  Performed by: Misty Gonzalez MD  Authorized by: Misty Gonzalez MD    Preanesthetic Checklist  Completed: patient identified, IV checked, site marked, risks and benefits discussed, surgical consent, monitors and equipment checked, pre-op evaluation and timeout performed  Peripheral Block  Patient position: supine  Prep: ChloraPrep  Patient monitoring: heart rate, cardiac monitor, continuous pulse ox, continuous capnometry and frequent blood pressure checks  Block type: adductor canal  Laterality: right  Injection technique: single shot  Needle  Needle type: Stimuplex   Needle gauge: 20 G  Needle length: 4 in  Needle localization: ultrasound guidance and anatomical landmarks   -ultrasound image captured on disc.  Assessment  Injection assessment: negative aspiration, negative parasthesia and local visualized surrounding nerve  Paresthesia pain: none  Heart rate change: no  Slow fractionated injection: yes  Pain Tolerance: comfortable throughout block and no complaints  Medications:    Medications: bupivacaine (pf) (MARCAINE) injection 0.25% - Perineural   30 mL - 1/10/2024 11:30:00 AM  dexAMETHasone sodium phos (PF) injection 10 mg/mL - Other   4 mg - 1/10/2024 11:30:00 AM    Additional Notes  VSS.  RN monitoring vitals throughout procedure.  Patient tolerated procedure well.    Ultrasound guidance used to visualize the nerve bundle and sheath as well as confirm needle placement and deposition of the local anesthetic.  (1) Under ultrasound guidance, needle was inserted and placed in close proximity to  the nerve bundle  (2) Ultrasound was also used to visualize the spread of the anesthetic in close proximity to the femoral artery  (3) The nerves appeared anatomically normal  (4) There were no apparent abnormal pathological findings    Ultrasound photos archived.  Pt tolerated procedure well. There were no immediate complications.

## 2024-01-10 NOTE — PLAN OF CARE
Problem: Physical Therapy  Goal: Physical Therapy Goal  Description: Pt will improve functional independence by performing:    Bed mobility: SBA  Sit to stand: SBA with rolling walker  Bed to chair: SBA with Stand Step  with rolling walker   Car Transfer: SBA with rolling walker  Ambulation x 200'  feet with SBA and rolling walker  1 Step (Curb): Min A  and rolling walker  8 Steps: Min A  and B HR  right knee AROM flexion (in degrees): 90  right knee AROM extension (in degrees): 0   Independent with total knee HEP     Outcome: Ongoing, Progressing

## 2024-01-10 NOTE — OP NOTE
OPERATIVE REPORT    Patient: Yaneth Deluna   : 1965    MRN: [id number]  Date: 01/10/2024      Surgeon: Ifeanyi Arriaga MD  Assistant: Marylin Fallon NP  Assistant was essential, part of the procedure including positioning, holding multiple retractors, deep hardware placement and deep closure.    Preoperative Diagnosis:  R knee primary osteoarthritis  Postoperative Diagnosis: Same  Procedure:  R LUCIANA TKA (07634)  Anesthesiologist: Misty Gonzalez MD  OR Staff: Circulator: Ingrid Plasencia RN; Sabra Duran RN  Scrub Person: Marylin Fallon FNP  Implants:   Implant Name Type Inv. Item Serial No.  Lot No. LRB No. Used Action   PIN BONE [id number] - [id number]  PIN BONE [id number]  [employer]  Right 1 Implanted and Explanted   COMP FEM CR BEAD SZ 4 RIGHT - [id number]  COMP FEM CR BEAD SZ 4 RIGHT  [employer] [id number] Right 1 Implanted   INSERT TIBIAL CS SIZE 4 9MM - [id number]  INSERT TIBIAL CS SIZE 4 9MM  [employer] [id number] Right 1 Implanted   BASEPLATE TIBIAL TRIATHLON SZ - [id number]  BASEPLATE TIBIAL TRIATHLON SZ  [employer] [id number] Right 1 Implanted     EBL: 50  Complications: None  Disposition: To PACU, stable    Indications: Yaneth Deluna is a 58 y.o. female presenting with R knee pain.  Patient had tried and failed all conservative measures including injections, activity modification, NSAIDs, and home exercise program.  Risks, benefits, and alternatives discussed with regards to right total knee arthroplasty.  All questions answered to patients satisfaction, no guarantees made.  Despite these risks, the patient agreed to proceed with surgical intervention.     Procedure Note:  The patient was brought back to the OR and placed supine on the OR table. After successful induction of anesthesia by anesthesia staff, all bony prominences were padded appropriately.  Tourniquet placed to the right upper thigh.  Right  knee prepped and draped in normal sterile fashion. At this time, a time-out was performed, with the correct patient, site, and procedure identified.  Preoperative antibiotics were verified as administered.     Leg was exsanguinated using Esmarch tourniquet and tourniquet was inflated to 250 mmHg.  Standard midline parapatellar incision was performed taken through the skin and subcutaneous tissue.  A fresh knife was used to make an arthrotomy midline parapatellar approach.  Proximal medial tibia retinaculam was released from the proximal medial tibial plateau.  Small resection of prepatellar fat pad was performed.  Knee flexed up.  Schanz pins attached to the distal femur as well as distal tibia.  [employer] robotic arrays attached to both sets of pins.  Checkpoints attached to distal femur and proximal tibia.  Registration performed initially with hip center followed by ankle center.  And registration of both the distal femur and proximal tibia performed using Ceferino protocol.  We then checked joint balance and ligamentous tension in both extension as well as 90° of flexion.  Adjusted femoral and tibial implants to get appropriate gaps in both extension as well as flexion.  After we were satisfied with implant position as well as volume resection, [employer] robot was brought in.  Femoral cuts performed followed by tibial cuts.  Care performed to protect all necessary soft tissue structures during bony resection.  Bony fragments and resected and removed from the knee.  Medial and lateral meniscus resected.  Tibial tray placed in the position and pinned.  Trial liner placed in position.  Trial femoral component placed in position and adjusted medially and laterally to the appropriate position.  Knee taken to full range of motion.  Came to full extension.  Excellent flexion greater than 120°.  Stable throughout his range of motion with no extension instability or flexion instability.  Patella tracked well.     Femoral lugs drilled  and the femoral trial removed.  We then punched the tibia with a tibial tower.  Tibial trial then removed.  Implants opened.  Tibia component implanted, followed by the polyethylene liner.  Femoral component then implanted.  At this point brought the knee to full range of motion yet again.  Again excellent range of motion from full extension greater than 120° of flexion was noted with no instability at full flexion, mid flexion and extension.  Checkpoints removed as well as pins.    The tourniquet was dropped.  All bleeders were coagulated.  Injection of joint cocktail periarticularly.  Copious irrigation with normal saline performed along with Betadine lavage followed by more normal saline.  We then turned our attention towards closure.  Arthrotomy closed with #1 Vicryl as well as #1 Stratafix suture.  Subcutaneous tissue closed with 2-0 Monocryl.  Followed by skin closure.    Patient arose from anesthesia without any issue and was then subsequently transferred to to PACU in a stable condition.    All sponge and needle counts were correct at the end of the case.  I was present and participated in all aspects of the procedure.    Prognosis:  The patient will be weight-bearing as tolerated to the right lower extremity with range of motion with physical therapy.  Patient will receive DVT prophylaxis .   plan discharge home versus a facility once the patient is stable with physical therapy guidelines.      This note/OR report was created with the assistance of  voice recognition software or phone  dictation.  There may be transcription errors as a result of using this technology however minimal. Effort has been made to assure accuracy of transcription but any obvious errors or omissions should be clarified with the author of the document.

## 2024-01-10 NOTE — PT/OT/SLP EVAL
Physical Therapy Evaluation    Patient Name:  Yaneth Deluna   MRN:  [id number]    Recommendations:     Discharge Recommendations: Low Intensity Therapy   Discharge Equipment Recommendations: walker, rolling   Barriers to discharge: None    Assessment:     Yaneth Deluna is a 58 y.o. female admitted with a medical diagnosis of <principal problem not specified>.  She presents with the following impairments/functional limitations: weakness, impaired endurance, impaired functional mobility, decreased lower extremity function, pain, decreased ROM, edema, orthopedic precautions.    Rehab Prognosis: Good; patient would benefit from acute skilled PT services to address these deficits and reach maximum level of function.    Recent Surgery: Procedure(s) (LRB):  ROBOTIC ARTHROPLASTY, KNEE, TOTAL (Right) Day of Surgery    Plan:     During this hospitalization, patient to be seen BID to address the identified rehab impairments via gait training, therapeutic activities, therapeutic exercises and progress toward the following goals:    Plan of Care Expires:  01/16/24    Subjective     Chief Complaint: R knee pain  Patient/Family Comments/goals:   Pain/Comfort:  Location - Side 1: Right  Location 1: knee  Pain Addressed 1: Pre-medicate for activity, Reposition, Distraction, Cessation of Activity    Patients cultural, spiritual, [employer] conflicts given the current situation:      Living Environment:  Pt lives with , 8 steps to enter home with tawnya. HR  Prior to admission, patients level of function was mod I.  Equipment used at home: walker, rolling.  DME owned (not currently used): none.  Upon discharge, patient will have assistance from .    Objective:     Communicated with nurse prior to session.  Patient found supine with peripheral IV, telemetry  upon PT entry to room.    General Precautions: Standard, fall  Orthopedic Precautions:RLE weight bearing as tolerated   Braces:    Respiratory Status: Room  air    Exams:  RLE ROM:     (In degrees) AROM PROM   R knee flexion 80 85   R knee extension 0       RLE Strength: NT dt sx side  LLE ROM: WFL  LLE Strength: WFL    Functional Mobility:  Bed Mobility:     Supine to Sit: stand by assistance  Transfers:     Sit to Stand:  contact guard assistance with rolling walker  Toilet Transfer: contact guard assistance with  rolling walker  using  Step Transfer  Gait: Pt ambulated 200 ft. W rw and CGA, using step through gait pattern at normal pace.        Treatment & Education:  Pt edu on total knee protocol and importance of frequent mobility  Pt did not complete prehab prior to sx.    Patient left up in chair with all lines intact, call button in reach, nurse notified, and  present.    GOALS:   Multidisciplinary Problems       Physical Therapy Goals          Problem: Physical Therapy    Goal Priority Disciplines Outcome Goal Variances Interventions   Physical Therapy Goal     PT, PT/OT Ongoing, Progressing     Description: Pt will improve functional independence by performing:    Bed mobility: SBA  Sit to stand: SBA with rolling walker  Bed to chair: SBA with Stand Step  with rolling walker   Car Transfer: SBA with rolling walker  Ambulation x 200'  feet with SBA and rolling walker  1 Step (Curb): Min A  and rolling walker  8 Steps: Min A  and B HR  right knee AROM flexion (in degrees): 90  right knee AROM extension (in degrees): 0   Independent with total knee HEP                          History:     Past Medical History:   Diagnosis Date    Arthritis     COPD (chronic obstructive pulmonary disease)     Osteoarthritis        Past Surgical History:   Procedure Laterality Date    BACK SURGERY  2014    hardware    CARPAL TUNNEL RELEASE Bilateral     CHOLECYSTECTOMY  2017    neck injections      for pain       Time Tracking:     PT Received On:    PT Start Time: 1557     PT Stop Time: 1615  PT Total Time (min): 18 min     Billable Minutes: Evaluation 18      01/10/2024

## 2024-01-10 NOTE — PLAN OF CARE
Problem: Adult Inpatient Plan of Care  Goal: Plan of Care Review  Outcome: Ongoing, Progressing  Goal: Patient-Specific Goal (Individualized)  Outcome: Ongoing, Progressing  Goal: Absence of Hospital-Acquired Illness or Injury  Outcome: Ongoing, Progressing  Goal: Optimal Comfort and Wellbeing  Outcome: Ongoing, Progressing  Goal: Readiness for Transition of Care  Outcome: Ongoing, Progressing     Problem: Infection  Goal: Absence of Infection Signs and Symptoms  Outcome: Ongoing, Progressing     Problem: Adjustment to Surgery (Knee Arthroplasty)  Goal: Optimal Coping  Outcome: Ongoing, Progressing     Problem: Bleeding (Knee Arthroplasty)  Goal: Absence of Bleeding  Outcome: Ongoing, Progressing     Problem: Bowel Motility Impaired (Knee Arthroplasty)  Goal: Effective Bowel Elimination  Outcome: Ongoing, Progressing     Problem: Fluid and Electrolyte Imbalance (Knee Arthroplasty)  Goal: Fluid and Electrolyte Balance  Outcome: Ongoing, Progressing     Problem: Functional Ability Impaired (Knee Arthroplasty)  Goal: Optimal Functional Ability  Outcome: Ongoing, Progressing     Problem: Infection (Knee Arthroplasty)  Goal: Absence of Infection Signs and Symptoms  Outcome: Ongoing, Progressing     Problem: Neurovascular Compromise (Knee Arthroplasty)  Goal: Intact Neurovascular Status  Outcome: Ongoing, Progressing     Problem: Ongoing Anesthesia Effects (Knee Arthroplasty)  Goal: Anesthesia/Sedation Recovery  Outcome: Ongoing, Progressing     Problem: Pain (Knee Arthroplasty)  Goal: Acceptable Pain Control  Outcome: Ongoing, Progressing     Problem: Postoperative Nausea and Vomiting (Knee Arthroplasty)  Goal: Nausea and Vomiting Relief  Outcome: Ongoing, Progressing     Problem: Postoperative Urinary Retention (Knee Arthroplasty)  Goal: Effective Urinary Elimination  Outcome: Ongoing, Progressing     Problem: Respiratory Compromise (Knee Arthroplasty)  Goal: Effective Oxygenation and Ventilation  Outcome: Ongoing,  Progressing     Problem: COPD (Chronic Obstructive Pulmonary Disease) Comorbidity  Goal: Maintenance of COPD Symptom Control  Outcome: Ongoing, Progressing

## 2024-01-11 VITALS
SYSTOLIC BLOOD PRESSURE: 111 MMHG | WEIGHT: 219.81 LBS | TEMPERATURE: 99 F | BODY MASS INDEX: 35.32 KG/M2 | HEIGHT: 66 IN | HEART RATE: 66 BPM | OXYGEN SATURATION: 95 % | RESPIRATION RATE: 18 BRPM | DIASTOLIC BLOOD PRESSURE: 51 MMHG

## 2024-01-11 LAB
ANION GAP SERPL CALC-SCNC: 6 MEQ/L
BUN SERPL-MCNC: 18 MG/DL (ref 9.8–20.1)
CALCIUM SERPL-MCNC: 9.3 MG/DL (ref 8.4–10.2)
CHLORIDE SERPL-SCNC: 107 MMOL/L (ref 98–107)
CO2 SERPL-SCNC: 26 MMOL/L (ref 22–29)
CREAT SERPL-MCNC: 0.96 MG/DL (ref 0.55–1.02)
CREAT/UREA NIT SERPL: 19
ERYTHROCYTE [DISTWIDTH] IN BLOOD BY AUTOMATED COUNT: 14.6 % (ref 11.5–17)
GFR SERPLBLD CREATININE-BSD FMLA CKD-EPI: >60 MLS/MIN/1.73/M2
GLUCOSE SERPL-MCNC: 116 MG/DL (ref 74–100)
HCT VFR BLD AUTO: 40.1 % (ref 37–47)
HGB BLD-MCNC: 12.6 G/DL (ref 12–16)
MCH RBC QN AUTO: 29.2 PG (ref 27–31)
MCHC RBC AUTO-ENTMCNC: 31.4 G/DL (ref 33–36)
MCV RBC AUTO: 92.8 FL (ref 80–94)
NRBC BLD AUTO-RTO: 0 %
PLATELET # BLD AUTO: 255 X10(3)/MCL (ref 130–400)
PMV BLD AUTO: 9 FL (ref 7.4–10.4)
POTASSIUM SERPL-SCNC: 5 MMOL/L (ref 3.5–5.1)
RBC # BLD AUTO: 4.32 X10(6)/MCL (ref 4.2–5.4)
SODIUM SERPL-SCNC: 139 MMOL/L (ref 136–145)
WBC # SPEC AUTO: 14.08 X10(3)/MCL (ref 4.5–11.5)

## 2024-01-11 PROCEDURE — 85027 COMPLETE CBC AUTOMATED: CPT | Performed by: ORTHOPAEDIC SURGERY

## 2024-01-11 PROCEDURE — 80048 BASIC METABOLIC PNL TOTAL CA: CPT | Performed by: ORTHOPAEDIC SURGERY

## 2024-01-11 PROCEDURE — G0378 HOSPITAL OBSERVATION PER HR: HCPCS

## 2024-01-11 PROCEDURE — 96366 THER/PROPH/DIAG IV INF ADDON: CPT

## 2024-01-11 PROCEDURE — 94761 N-INVAS EAR/PLS OXIMETRY MLT: CPT

## 2024-01-11 PROCEDURE — 25000003 PHARM REV CODE 250: Performed by: NURSE PRACTITIONER

## 2024-01-11 PROCEDURE — 94799 UNLISTED PULMONARY SVC/PX: CPT | Mod: XB

## 2024-01-11 PROCEDURE — 97530 THERAPEUTIC ACTIVITIES: CPT

## 2024-01-11 PROCEDURE — 25000003 PHARM REV CODE 250: Performed by: ORTHOPAEDIC SURGERY

## 2024-01-11 PROCEDURE — 97116 GAIT TRAINING THERAPY: CPT

## 2024-01-11 PROCEDURE — 63600175 PHARM REV CODE 636 W HCPCS: Performed by: ORTHOPAEDIC SURGERY

## 2024-01-11 RX ORDER — POLYETHYLENE GLYCOL 3350 17 G/17G
17 POWDER, FOR SOLUTION ORAL DAILY
Qty: 14 EACH | Refills: 0 | Status: SHIPPED | OUTPATIENT
Start: 2024-01-11 | End: 2024-01-25

## 2024-01-11 RX ORDER — NAPROXEN SODIUM 220 MG/1
81 TABLET, FILM COATED ORAL 2 TIMES DAILY
Qty: 84 TABLET | Refills: 0 | Status: SHIPPED | OUTPATIENT
Start: 2024-01-11 | End: 2024-05-08

## 2024-01-11 RX ORDER — CEFADROXIL 500 MG/1
500 CAPSULE ORAL EVERY 12 HOURS
Qty: 14 CAPSULE | Refills: 0 | Status: SHIPPED | OUTPATIENT
Start: 2024-01-11 | End: 2024-01-18

## 2024-01-11 RX ORDER — HYDROCODONE BITARTRATE AND ACETAMINOPHEN 5; 325 MG/1; MG/1
1 TABLET ORAL EVERY 4 HOURS PRN
Status: DISCONTINUED | OUTPATIENT
Start: 2024-01-11 | End: 2024-01-11 | Stop reason: HOSPADM

## 2024-01-11 RX ORDER — HYDROCODONE BITARTRATE AND ACETAMINOPHEN 7.5; 325 MG/1; MG/1
1 TABLET ORAL EVERY 4 HOURS PRN
Status: DISCONTINUED | OUTPATIENT
Start: 2024-01-11 | End: 2024-01-11 | Stop reason: HOSPADM

## 2024-01-11 RX ORDER — METHOCARBAMOL 750 MG/1
750 TABLET, FILM COATED ORAL EVERY 6 HOURS PRN
Qty: 56 TABLET | Refills: 0 | Status: SHIPPED | OUTPATIENT
Start: 2024-01-11 | End: 2024-01-25

## 2024-01-11 RX ORDER — HYDROCODONE BITARTRATE AND ACETAMINOPHEN 7.5; 325 MG/1; MG/1
1 TABLET ORAL EVERY 4 HOURS PRN
Qty: 42 TABLET | Refills: 0 | Status: SHIPPED | OUTPATIENT
Start: 2024-01-11 | End: 2024-01-18

## 2024-01-11 RX ADMIN — DOCUSATE SODIUM 200 MG: 100 CAPSULE, LIQUID FILLED ORAL at 05:01

## 2024-01-11 RX ADMIN — METOCLOPRAMIDE 10 MG: 10 TABLET ORAL at 02:01

## 2024-01-11 RX ADMIN — DULOXETINE HYDROCHLORIDE 60 MG: 30 CAPSULE, DELAYED RELEASE ORAL at 09:01

## 2024-01-11 RX ADMIN — HYDROCODONE BITARTRATE AND ACETAMINOPHEN 1 TABLET: 5; 325 TABLET ORAL at 09:01

## 2024-01-11 RX ADMIN — LACTULOSE 20 G: 20 SOLUTION ORAL at 10:01

## 2024-01-11 RX ADMIN — CEFAZOLIN 2 G: 2 INJECTION, POWDER, FOR SOLUTION INTRAMUSCULAR; INTRAVENOUS at 04:01

## 2024-01-11 RX ADMIN — FAMOTIDINE 20 MG: 20 TABLET ORAL at 09:01

## 2024-01-11 RX ADMIN — KETOROLAC TROMETHAMINE 10 MG: 10 TABLET, FILM COATED ORAL at 05:01

## 2024-01-11 RX ADMIN — TRAMADOL HYDROCHLORIDE 50 MG: 50 TABLET, COATED ORAL at 04:01

## 2024-01-11 RX ADMIN — METOCLOPRAMIDE 10 MG: 10 TABLET ORAL at 09:01

## 2024-01-11 RX ADMIN — TRAMADOL HYDROCHLORIDE 50 MG: 50 TABLET, COATED ORAL at 12:01

## 2024-01-11 RX ADMIN — SENNOSIDES AND DOCUSATE SODIUM 2 TABLET: 8.6; 5 TABLET ORAL at 09:01

## 2024-01-11 RX ADMIN — ASPIRIN 81 MG CHEWABLE TABLET 81 MG: 81 TABLET CHEWABLE at 09:01

## 2024-01-11 NOTE — PLAN OF CARE
Problem: Adult Inpatient Plan of Care  Goal: Plan of Care Review  Outcome: Met  Goal: Patient-Specific Goal (Individualized)  Outcome: Met  Goal: Absence of Hospital-Acquired Illness or Injury  Outcome: Met  Goal: Optimal Comfort and Wellbeing  Outcome: Met  Goal: Readiness for Transition of Care  Outcome: Met     Problem: Infection  Goal: Absence of Infection Signs and Symptoms  Outcome: Met     Problem: Adjustment to Surgery (Knee Arthroplasty)  Goal: Optimal Coping  Outcome: Met     Problem: Bleeding (Knee Arthroplasty)  Goal: Absence of Bleeding  Outcome: Met     Problem: Bowel Motility Impaired (Knee Arthroplasty)  Goal: Effective Bowel Elimination  Outcome: Met     Problem: Fluid and Electrolyte Imbalance (Knee Arthroplasty)  Goal: Fluid and Electrolyte Balance  Outcome: Met     Problem: Functional Ability Impaired (Knee Arthroplasty)  Goal: Optimal Functional Ability  Outcome: Met     Problem: Infection (Knee Arthroplasty)  Goal: Absence of Infection Signs and Symptoms  Outcome: Met     Problem: Neurovascular Compromise (Knee Arthroplasty)  Goal: Intact Neurovascular Status  Outcome: Met     Problem: Ongoing Anesthesia Effects (Knee Arthroplasty)  Goal: Anesthesia/Sedation Recovery  Outcome: Met     Problem: Pain (Knee Arthroplasty)  Goal: Acceptable Pain Control  Outcome: Met     Problem: Postoperative Nausea and Vomiting (Knee Arthroplasty)  Goal: Nausea and Vomiting Relief  Outcome: Met     Problem: Postoperative Urinary Retention (Knee Arthroplasty)  Goal: Effective Urinary Elimination  Outcome: Met     Problem: Respiratory Compromise (Knee Arthroplasty)  Goal: Effective Oxygenation and Ventilation  Outcome: Met     Problem: COPD (Chronic Obstructive Pulmonary Disease) Comorbidity  Goal: Maintenance of COPD Symptom Control  Outcome: Met     Problem: Physical Therapy  Goal: Physical Therapy Goal  Description: Pt will improve functional independence by performing:    Bed mobility: SBA  Sit to stand: SBA  with rolling walker MET  Bed to chair: SBA with Stand Step  with rolling walker   Car Transfer: SBA with rolling walker MET  Ambulation x 200'  feet with SBA and rolling walker MET  1 Step (Curb): Min A  and rolling walker MET  8 Steps: Min A  and B HR MET  right knee AROM flexion (in degrees): 90  right knee AROM extension (in degrees): 0  MET  Independent with total knee HEP     Outcome: Met

## 2024-01-11 NOTE — DISCHARGE INSTRUCTIONS
[facility]  [address]  [place] [address]  Phone [phone]       /      Fax [phone]  SURGEON: Dr. Arriaga    After discharge, all questions or concerns should be handled at your surgeon's office ([phone]). If it is a weekend or after hours, you will get the surgeon on call.     Discharge Medications:    PAIN MANAGEMENT: Next Dose Available   Robaxin/Methocarbamol 750mg (Muscle Relaxer) - Every 6-8 hours AS NEEDED for muscle spasms, thigh pain or additional pain control Start anytime   Norco 7.5/325mg (Hydrocodone/Acetaminophen) (Pain Med) - every 4-6 hours AS NEEDED for pain 2:00pm   COMPLICATION PREVENTION MEDS: Next Dose DUE   Aspirin 81mg twice a day for 6 weeks post-op for blood clot prevention PM on 1/11/2023   MiraLAX 17gm - once or twice a day while on narcotics and muscle relaxers for constipation prevention PM on 1/11/2023   Duracef/Cefadroxil 500mg (Antibiotic) - twice a day for 7 days post-op for infection prevention PM on 1/11/2023       Total Knee Replacement                                                                                                                                    PAIN MEDICATIONS/PAIN MANAGEMENT: (Use the medication log in your discharge packet to keep track of your medications)  For best wound healing, NO anti-inflammatories (Ibuprofen, Aleve, Motrin, Naproxen, Mobic/Meloxicam, Toradol/Ketorolac, Celebrex, Diclofenac/Voltaren...etc) for 2 weeks or until the wound is healed.    Norco 7.5/325mg (Hydrocodone/Acetaminophen) (pain pill) - You can take 1 tablet every 4-6 hours for pain. If the pain is mild, take 1/2 of a pill. Once you start taking a half of a pain pill, you can take a Tylenol 325mg with each dose for a little extra pain relief without side effects. Gradually decrease the use as the pain lessens. As you decrease the Norco, increase the Tylenol.    **NO MORE THAN 3000mg OF TYLENOL IN 24 HOURS**.      Robaxin/Methocarbamol 750mg (muscle relaxer)- you can take every 6-8 hours as needed for muscle spasms, thigh pain and stiffness, additional pain control or breakthrough pain medications. This medication is helpful for pain control while lessening your need for narcotics. Please reduce the use gradually as the pain and spasms lessen. DO NOT TAKE AT THE SAME TIME AS A PAIN PILL. YOU WILL BE BETTER SERVED WITH 2 HOURS BETWEEN PAIN PILL AND MUSCLE RELAXER.     Lyrica 100mg (neuropathic/shocking/ nerve like pain) - ok to restart home regimen.  If you get to sleepy during the day, SWITCH TO BEDTIME DOSING or decrease dose and frequency while on pain pills and muscle relaxers.     **Other things that help with pain control is WALKING, COMPRESSION WRAP, ICE and ELEVATION!!**    BLOOD CLOT PREVENTION:   Aspirin 81mg (blood thinner) - twice a day for 6 weeks for blood clot prevention. Start on the evening of 1/11/24. Stop on 2/22/24.  If you were taking Baby Aspirin 81mg prior to surgery, may return to daily dose AFTER 6 weeks - 2/23/24.    You need to continuing wearing your compression stocking (DAMIAN Hose - ThromboEmbolic Disease Prevention Device) for the next 2-6 weeks post-op. It is ok to remove them for hygiene and at bedtime.   Hand wash and Dry. **If the swelling persists in the legs after you stop wearing the Damian hose, continue to wear them until the swelling decreases.**  REMOVE STOCKINGS AT LEAST DAILY FOR SKIN ASSESSMENT.   Do NOT let the stockings roll down, creating a tourniquet around the back of your knee. If you need to, leave the excess at the bottom of the stocking.   The best thing you can do to prevent blood clots is to walk around as much as possible, AT LEAST EVERY 1-2 HOURS.       CONSTIPATION PREVENTION:   Miralax or Senokot S/Marilyn-Colace and Stool softeners EVERY DAY while on pain meds.  Use other more aggressive over the counter LAXATIVES as needed for constipation (Examples: Milk of Magnesia,  Dulcolax tabs or suppository, Magnesium Citrate, Fleet's Enema...etc.)   Drink lots of water.  Increase Fiber in diet.  Increase walking distance each day  DO NOT GO MORE THAN 2 DAYS WITHOUT HAVING A BOWEL MOVEMENT!    ACTIVITY:   Weight bearing precautions as follows:  FULL weight bearing to operative leg with walker.   DO NOT TAKE YOURSELF OFF OF THE WALKER TOO SOON. ALLOW YOUR OUTPATIENT THERAPIST or SURGEON TO GUIDE YOU.   Range of motion as tolerated. Work on BENDING and STRAIGHTENING your knee. Change positions often throughout the day. DO NOT PUT ANYTHING BEHIND THE KNEE KEEPING IT IN A BENT POSITION.   Elevate affected extremity way ABOVE THE LEVEL OF THE HEART to reduce swelling.  Walk around at least every 1-2 hours while awake.   No heavy lifting, pulling, pushing or straining.  Ice the Knee, thigh and lower leg AS MUCH AS POSSIBLE  Outpatient Physical Therapy - Bring prescription to clinic of choice as soon as possible.      WOUND CARE:   Dry dressing changes every other day and as needed for soiling for 14 days. Start SATURDAY. NOTIFY MD OF EXCESSIVE WOUND DRAINAGE.     DO NOT WET WOUND or apply any ointments, creams, lotions or antiseptics.  Ace wrap - apply your compression stocking and apply the ace wrap where the stocking stops for extra added compression to the knee.   May wet incision AFTER 2 weeks- (after you follow-up with your surgeon).   Ok to shower before then if able to keep wound from getting wet (plastic barrier, saran wrap or cling wrap and tape).   DO NOT TOUCH INCISION      URINARY RETENTION:  If you start having difficulty urinating, decrease the use of Pain pills and muscle relaxers and notify your primary care doctor.     PNEUMONIA PREVENTION:  Stay out of bed as much as possible and walk around every 1-2 hours.  Continue breathing exercises (Incentive Spirometry) every 1-2 hours while mobility is limited and while you are on pain pills.    FALL PREVENTION:  Wear sturdy shoes that  fit well - Wearing shoes with high heels or slippery soles, or shoes that are too loose, can lead to falls. Walking around in bare feet, or only socks, can also increase your risk of falling.  Use walker as long as your surgeon and therapist recommend it  Use good lighting and  throw rugs, electrical cords, furniture and clutter (anything than can cause you to trip at home.   Non-slip rug in bathroom or shower      INFECTION PREVENTION:  Duracef/Cefadroxil 500mg (Antibiotic) -  take twice a day for 7 days to prevent infection  Proper handwashing before and after dressing changes. Do not wet the wound. Wound care instructions as written above. NOTIFY MD OF EXCESSIVE WOUND DRAINAGE.  No alcohol, smoking or tobacco products  Pets should not be allowed around the wound or the dressing.   Treat UTI and skin infections as soon as possible.  Pre-medicate with antibiotics prior to dental or surgical procedures.   If you are diabetic, MAINTAIN GOOD BLOOD SUGAR CONTROL (Below 150) DURING YOUR RECOVERY. If you see high numbers, notify your primary care doctor.     Call your SURGEON'S OFFICE ([phone]) if you experience the following signs and symptoms of infection:   Unusual redness, swelling, excessive, cloudy or foul smelling drainage at the incision site.   Persistent low grade temp OR a temp greater than 102 F, unrelieved by Tylenol  Pain at surgical site, unrelieved by pain meds    Warning signs of a blood clot in your leg: (CALL YOUR SURGEON)  New onset or increasing pain in calf, new onset tenderness or redness above or below the knee or increasing swelling of your calf, ankle, or foot.  Warning signs that a blood clot has traveled to your lungs: (REPORT TO THE ER/CALL 995)  Sudden or increase in Shortness of breath, sudden onset of chest pains, or  Localized chest pain with coughing.       IF ANY ISSUES ARISE AND YOU FEEL THE NEED TO CALL YOUR PRIMARY CARE DOCTOR, PLEASE LET YOUR SURGEON KNOW AS WELL.     For  emergencies, please report to OUR ([facility] or [facility] main [facility]) Emergency department and tell them to call YOUR SURGEON at [phone].     BEFORE MAKING ANY CHANGES TO THE MEDICAL CARE PLAN OR GOING TO THE EMERGENCY ROOM, PLEASE CONTACT THE SURGEON.    3rd floor nursing unit # [phone]  Use this number for questions about your discharge instructions or problems filling your discharge prescriptions.

## 2024-01-11 NOTE — PROGRESS NOTES
"No acute events overnight.  Pain controlled.  Resting in bed. Feeling well this AM.    Vital Signs  Temp: 98.5 °F (36.9 °C)  Temp Source: Oral  Pulse: 66  Heart Rate Source: Monitor  Resp: 18  SpO2: 97 %  Pulse Oximetry Type: Continuous  Flow (L/min): 10  Oxygen Concentration (%): 80  Device (Oxygen Therapy): Face tent  BP: (!) 111/51  BP Location: Right arm  BP Method: Automatic  Patient Position: Lying  Height and Weight  Height: 5' 6" (167.6 cm)  Height Method: Stated  Weight: 99.7 kg (219 lb 12.8 oz)  Weight Method: Standard Scale  BSA (Calculated - sq m): 2.15 sq meters  BMI (Calculated): 35.5  Weight in (lb) to have BMI = 25: 154.6]    +FHL/EHL  BCR distally  Dressing c/d/i  SILT distally    Recent Lab Results         01/11/24  0507   01/11/24  0504   01/10/24  1110        Anion Gap 6.0           BUN 18.0           BUN/CREAT RATIO 19           Calcium 9.3           Chloride 107           CO2 26           Creatinine 0.96           eGFR >60           Glucose 116           Hematocrit   40.1   39.7       Hemoglobin   12.6   12.8       MCH   29.2         MCHC   31.4         MCV   92.8         MPV   9.0         nRBC   0.0         Platelet Count   255         Potassium 5.0           RBC   4.32         RDW   14.6         Sodium 139           WBC   14.08                 A/P:  Status post TKA  Pain controlled  Overall patient doing well.  Therapy for mobility and ambulation.  ASA for DVT PPx    "

## 2024-01-11 NOTE — PLAN OF CARE
01/11/24 1100   Discharge Assessment   Assessment Type Discharge Planning Assessment   Source of Information patient;family   Does patient/caregiver understand observation status Yes   Communicated ASCENCION with patient/caregiver Yes   Reason For Admission s/p TKR   People in Home spouse   Do you expect to return to your current living situation? Yes   Do you have help at home or someone to help you manage your care at home? Yes   Who are your caregiver(s) and their phone number(s)? - NITISH [phone]   Prior to hospitilization cognitive status: Alert/Oriented   Current cognitive status: Alert/Oriented   Walking or Climbing Stairs Difficulty yes   Walking or Climbing Stairs ambulation difficulty, requires equipment   Dressing/Bathing Difficulty yes   Dressing/Bathing bathing difficulty, requires equipment   Equipment Currently Used at Home walker, rolling   Readmission within 30 days? No   Patient currently being followed by outpatient case management? No   Do you currently have service(s) that help you manage your care at home? No   Do you take prescription medications? Yes   Do you have prescription coverage? Yes   Do you have any problems affording any of your prescribed medications? No   Is the patient taking medications as prescribed? yes   Who is going to help you get home at discharge? NITISH   How do you get to doctors appointments? car, drives self   Are you on dialysis? No   Do you take coumadin? No   Discharge Plan A Home with family   Discharge Plan B Home with family   DME Needed Upon Discharge  walker, rolling   Discharge Plan discussed with: Patient;Spouse/sig other   Transition of Care Barriers None     S/p TKR. Spk w pt & , Nitish ... hsb to asst w homecare. Pt has RW. Provider list given. Foc obtained.   Called referral for outpatient [facility] to [facility]. They will contact pt with appt date & time.   PCP: Dr. Simon Rocha   Contact # Nitish [phone]      Patient DID participate  in a pre-op exercise regimen

## 2024-01-11 NOTE — NURSING
Nurses Note -- 4 Eyes      1/10/2024   1600pM      Skin assessed during: Admit      [x] No Altered Skin Integrity Present    [x]Prevention Measures Documented      [] Yes- Altered Skin Integrity Present or Discovered   [] LDA Added if Not in Epic (Describe Wound)   [] New Altered Skin Integrity was Present on Admit and Documented in LDA   [] Wound Image Taken    Wound Care Consulted? No    Attending Nurse:  Morena Wolff RN/Staff Member:   elba herrera rn

## 2024-01-11 NOTE — NURSING
Pt escorted to husbands truck via WC. Coverlets given. Pt stable, no distress, eager to discharge. Pt advised to call md office with any questions/concerns.

## 2024-01-11 NOTE — PT/OT/SLP PROGRESS
"Physical Therapy Treatment    Patient Name:  Yaneth Deluna   MRN:  [id number]    Recommendations:     Discharge Recommendations: Low Intensity Therapy  Discharge Equipment Recommendations: walker, rolling  Barriers to discharge: None    Assessment:     Yaneth Deluna is a 58 y.o. female admitted with a medical diagnosis of Primary osteoarthritis of right knee.  She presents with the following impairments/functional limitations: weakness, impaired endurance, impaired functional mobility, decreased lower extremity function, pain, decreased ROM, edema, orthopedic precautions.    Rehab Prognosis: Good; patient would benefit from acute skilled PT services to address these deficits and reach maximum level of function.    Recent Surgery: Procedure(s) (LRB):  ROBOTIC ARTHROPLASTY, KNEE, TOTAL (Right) 1 Day Post-Op    Plan:     During this hospitalization, patient to be seen BID to address the identified rehab impairments via gait training, therapeutic activities, therapeutic exercises and progress toward the following goals:    Plan of Care Expires:  01/16/24    Subjective     Chief Complaint: R knee pain  Patient/Family Comments/goals:   Pain/Comfort:  Location - Side 1: Right  Location 1: knee  Pain Addressed 1: Pre-medicate for activity, Reposition, Distraction, Cessation of Activity      Objective:     Communicated with nurse prior to session.  Patient found supine with peripheral IV, telemetry upon PT entry to room.     General Precautions: Standard, fall  Orthopedic Precautions: RLE weight bearing as tolerated  Braces:    Respiratory Status: Room air     Functional Mobility:  Bed Mobility:     Supine to Sit: stand by assistance  Transfers:     Sit to Stand:  stand by assistance with rolling walker  Car Transfer: stand by assistance with  rolling walker  using  Step Transfer  Gait: Pt ambulated 250 ft, 200 ft. W rw and SBA, using step through gait pattern at normal pace.  Stairs:  Pt ascended/descended 9 stair(s) and 4" " curb step with Rolling Walker with bilateral handrails with Contact Guard Assistance.       (In degrees) AROM PROM   R knee flexion 75 85   R knee extension 0         Treatment & Education:  Pt edu on total knee protocol and importance of frequent mobility  Pt completed TKA therex x10 AAROM    Patient left up in chair with all lines intact, call button in reach, nurse notified, and  present..    GOALS:   Multidisciplinary Problems       Physical Therapy Goals          Problem: Physical Therapy    Goal Priority Disciplines Outcome Goal Variances Interventions   Physical Therapy Goal     PT, PT/OT Ongoing, Progressing     Description: Pt will improve functional independence by performing:    Bed mobility: SBA  Sit to stand: SBA with rolling walker MET  Bed to chair: SBA with Stand Step  with rolling walker   Car Transfer: SBA with rolling walker MET  Ambulation x 200'  feet with SBA and rolling walker MET  1 Step (Curb): Min A  and rolling walker MET  8 Steps: Min A  and B HR MET  right knee AROM flexion (in degrees): 90  right knee AROM extension (in degrees): 0  MET  Independent with total knee HEP                          Time Tracking:     PT Received On:    PT Start Time: 1025     PT Stop Time: 1049  PT Total Time (min): 24 min     Billable Minutes: Gait Training 16 and Therapeutic Activity 8    Treatment Type: Treatment  PT/PTA: PT     Number of PTA visits since last PT visit: 0     01/11/2024

## 2024-01-11 NOTE — PLAN OF CARE
Problem: Physical Therapy  Goal: Physical Therapy Goal  Description: Pt will improve functional independence by performing:    Bed mobility: SBA  Sit to stand: SBA with rolling walker MET  Bed to chair: SBA with Stand Step  with rolling walker   Car Transfer: SBA with rolling walker MET  Ambulation x 200'  feet with SBA and rolling walker MET  1 Step (Curb): Min A  and rolling walker MET  8 Steps: Min A  and B HR MET  right knee AROM flexion (in degrees): 90  right knee AROM extension (in degrees): 0  MET  Independent with total knee HEP     Outcome: Ongoing, Progressing

## 2024-01-12 ENCOUNTER — PATIENT OUTREACH (OUTPATIENT)
Dept: ADMINISTRATIVE | Facility: CLINIC | Age: 59
End: 2024-01-12
Payer: MEDICARE

## 2024-01-12 NOTE — PROGRESS NOTES
C3 nurse attempted to contact Yaneth Deluna for a TCC post hospital discharge follow up call. No answer. No voicemail available.The patient does not have a scheduled HOSFU appointment. Message sent to PCP staff for assistance with scheduling visit with patient.  The patient does have a POST OP appt with Ifeanyi Arriaga MD (Orthopedic Surgery) on 1/25/2024; @ 8:30am.

## 2024-01-12 NOTE — DISCHARGE SUMMARY
[facility]  Discharge Summary      Admit Date: 1/10/2024    Discharge Date and Time: 1/11/2024 11:02 AM    Attending Physician: Maryam att. providers found     Reason for Admission: primary OA right knee    Procedures Performed: Procedure(s) (LRB):  ROBOTIC ARTHROPLASTY, KNEE, TOTAL (Right)    Hospital Course Patient seen in clinic with complaints of right knee pain. Tried and failed all conservative measures including activity modification, anti-inflammatories, and injections. Patient felt as though they have reached a point of disability with regards to right knee pain. Risk, benefits, and alternatives discussed for right total knee arthroplasty. Despite these risks, the patient wished to proceed with surgical intervention.    Patient admitted as an outpatient. Seen preoperatively by anesthesia and cleared for surgery. Patient was then taken to the OR and a right total knee arthroplasty was performed. For full details please see dictated operative note. Patient tolerated the procedure without any issues and was transferred to the floor postoperatively. Physical therapy began working with the patient on postop day 1 and patient was made weightbearing as tolerated to affected extremity. Patient progressed well with physical therapy and eventually cleared all physical therapy guidelines. Patient's pain and vitals remained stable throughout hospitalization. Wound was checked and found to be clean, dry, and intact. At this point the patient was deemed suitable to discharge home.     Goals of Care Treatment Preferences:         Consults: PT    Significant Diagnostic Studies:   Specimen (24h ago, onward)      None            Final Diagnoses:    Principal Problem: Primary osteoarthritis of right knee   Secondary Diagnoses:   Active Hospital Problems    Diagnosis  POA    *Primary osteoarthritis of right knee [M17.11]  Yes      Resolved Hospital Problems   No resolved problems to display.        Discharged Condition: good    Disposition: Home or Self Care    Follow Up/Patient Instructions:     Medications:  Reconciled Home Medications:      Medication List        START taking these medications      aspirin 81 MG Chew  Take 1 tablet (81 mg total) by mouth 2 (two) times a day. Blood clot prevention     cefadroxil 500 MG Cap  Commonly known as: DURICEF  Take 1 capsule (500 mg total) by mouth every 12 (twelve) hours. for 7 days     HYDROcodone-acetaminophen 7.5-325 mg per tablet  Commonly known as: NORCO  Take 1 tablet by mouth every 4 (four) hours as needed for Pain.     methocarbamoL 750 MG Tab  Commonly known as: ROBAXIN  Take 1 tablet (750 mg total) by mouth every 6 (six) hours as needed (muscle spasms).     polyethylene glycol 17 gram Pwpk  Commonly known as: GLYCOLAX  Take 17 g by mouth once daily. for 14 days            CONTINUE taking these medications      * albuterol 90 mcg/actuation inhaler  Commonly known as: PROVENTIL/VENTOLIN HFA  Inhale 2 puffs into the lungs.     * albuterol 2.5 mg /3 mL (0.083 %) nebulizer solution  Commonly known as: PROVENTIL  albuterol sulfate 2.5 mg/3 mL (0.083 %) solution for nebulization   use as needed     DULoxetine 60 MG capsule  Commonly known as: CYMBALTA  Take 60 mg by mouth.     esomeprazole 40 MG capsule  Commonly known as: NEXIUM  daily as needed.     LIDOcaine 5 %  Commonly known as: LIDODERM  lidocaine 5 % topical patch     LYRICA 100 MG capsule  Generic drug: pregabalin  Take 100 mg by mouth 3 (three) times daily.     pantoprazole 40 MG tablet  Commonly known as: PROTONIX  Take 40 mg by mouth every morning.     TRELEGY ELLIPTA 200-62.5-25 mcg inhaler  Generic drug: fluticasone-umeclidin-vilanter  Inhale 1 puff into the lungs.           * This list has 2 medication(s) that are the same as other medications prescribed for you. Read the directions carefully, and ask your doctor or other care provider to review them with you.                STOP taking these  medications      celecoxib 200 MG capsule  Commonly known as: CeleBREX     cyclobenzaprine 10 MG tablet  Commonly known as: FLEXERIL            No discharge procedures on file.   Follow-up Information       Ifeanyi Arriaga MD. Go on 1/25/2024.    Specialty: Orthopedic Surgery  Why: Ortho follow up appt on Thursday 1/25/24 @ 8:30am w/ Marylin (Dr Arriaga's NP))  Contact information:  [facility]  [address]  [facility] [address]  [phone]               [facility] - Follow up.    Specialties: Physical Therapy, Occupational Therapy  Why: This is the outpatient therapy facility.They will contact pt with appt date & time. Call if you have questions or concerns.  Contact information:  [address] [place] [address] [address]  [facility] [address]  [phone]

## 2024-01-13 LAB — VIEW PATHOLOGY REPORT (RELIAPATH): NORMAL

## 2024-01-16 NOTE — PROGRESS NOTES
I have seen the patient, reviewed the Nurse Practitioner's discharge summary. I have personally interviewed and examined the patient at bedside and: agree with the findings.     Ifeanyi Arriaga MD  Orthopedic Surgery

## 2024-01-17 NOTE — PROGRESS NOTES
C3 nurse spoke with Yaneth Deluna for a TCC post hospital discharge follow up call. The patient does not have a scheduled HOSFU appointment with Vasyl Rocha III, MD within 5-7 days post hospital discharge date 1/11/24. C3 nurse was unable to schedule HOSFU appointment in [facility].    Message sent to PCP staff requesting they contact patient and schedule follow up appointment.  The patient does have a f/u with Ifeanyi Arriaga MD (Orthopedic Surgery) on 1/25/2024;  @ 8:30am

## 2024-01-17 NOTE — PROGRESS NOTES
2nd attempt-C3 nurse attempted to contact Yaneth Deluna for a TCC post hospital discharge follow up call. No answer. No voicemail available.The patient does not have a scheduled HOSFU appointment. Message sent to PCP staff for assistance with scheduling visit with patient.  The patient does have a POST OP appt with Ifeanyi Arriaga MD (Orthopedic Surgery) on 1/25/2024; @ 8:30am.

## 2024-01-25 ENCOUNTER — OFFICE VISIT (OUTPATIENT)
Dept: ORTHOPEDICS | Facility: CLINIC | Age: 59
End: 2024-01-25
Payer: MEDICARE

## 2024-01-25 ENCOUNTER — HOSPITAL ENCOUNTER (OUTPATIENT)
Dept: RADIOLOGY | Facility: CLINIC | Age: 59
Discharge: HOME | End: 2024-01-25
Attending: NURSE PRACTITIONER
Payer: MEDICARE

## 2024-01-25 VITALS
DIASTOLIC BLOOD PRESSURE: 61 MMHG | HEART RATE: 63 BPM | WEIGHT: 220 LBS | HEIGHT: 66 IN | BODY MASS INDEX: 35.36 KG/M2 | SYSTOLIC BLOOD PRESSURE: 111 MMHG

## 2024-01-25 DIAGNOSIS — Z96.651 AFTERCARE FOLLOWING RIGHT KNEE JOINT REPLACEMENT SURGERY: Primary | ICD-10-CM

## 2024-01-25 DIAGNOSIS — Z96.651 AFTERCARE FOLLOWING RIGHT KNEE JOINT REPLACEMENT SURGERY: ICD-10-CM

## 2024-01-25 DIAGNOSIS — Z47.1 AFTERCARE FOLLOWING RIGHT KNEE JOINT REPLACEMENT SURGERY: Primary | ICD-10-CM

## 2024-01-25 DIAGNOSIS — Z47.1 AFTERCARE FOLLOWING RIGHT KNEE JOINT REPLACEMENT SURGERY: ICD-10-CM

## 2024-01-25 PROCEDURE — 99024 POSTOP FOLLOW-UP VISIT: CPT | Mod: ,,, | Performed by: NURSE PRACTITIONER

## 2024-01-25 PROCEDURE — 3074F SYST BP LT 130 MM HG: CPT | Mod: CPTII,,, | Performed by: NURSE PRACTITIONER

## 2024-01-25 PROCEDURE — 1159F MED LIST DOCD IN RCRD: CPT | Mod: CPTII,,, | Performed by: NURSE PRACTITIONER

## 2024-01-25 PROCEDURE — 73562 X-RAY EXAM OF KNEE 3: CPT | Mod: RT,,, | Performed by: NURSE PRACTITIONER

## 2024-01-25 PROCEDURE — 3078F DIAST BP <80 MM HG: CPT | Mod: CPTII,,, | Performed by: NURSE PRACTITIONER

## 2024-01-25 RX ORDER — CEFADROXIL 500 MG/1
1 CAPSULE ORAL EVERY 12 HOURS
COMMUNITY
End: 2024-05-08

## 2024-01-25 RX ORDER — HYDROCODONE BITARTRATE AND ACETAMINOPHEN 7.5; 325 MG/1; MG/1
1 TABLET ORAL EVERY 6 HOURS PRN
COMMUNITY
End: 2024-05-08

## 2024-01-25 NOTE — PROGRESS NOTES
Chief Complaint:   Chief Complaint   Patient presents with    Right Knee - Post-op Evaluation     Pt states she is feeling great. Pt is attending PT, moving around w/o assistance and doing exercises at home. Pt denies pain and states her ROM is 101.        History of present illness: Yaneth Deluna is a 58 y.o. female, in regards to her right knee.  Patient is 2 weeks out from surgery.  Overall doing very well.  Ambulating without any assistance.  Currently in physical therapy for strengthening, range of motion, mobility.  Doing well with this.  Her flexion is greater than 100 in therapy.  Regards incision site denies any drainage or bleeding.  Dressing is clean dry and intact.  Denies any pain.  Not currently on any pain medications.      Past Medical History:   Diagnosis Date    Arthritis     COPD (chronic obstructive pulmonary disease)     Osteoarthritis        Past Surgical History:   Procedure Laterality Date    BACK SURGERY  2014    hardware    CARPAL TUNNEL RELEASE Bilateral     CHOLECYSTECTOMY  2017    neck injections      for pain    ROBOTIC ARTHROPLASTY, KNEE Right 1/10/2024    Procedure: ROBOTIC ARTHROPLASTY, KNEE, TOTAL;  Surgeon: Ifeanyi Arriaga MD;  Location: [facility];  Service: Orthopedics;  Laterality: Right;       Current Outpatient Medications   Medication Sig    albuterol (PROVENTIL) 2.5 mg /3 mL (0.083 %) nebulizer solution albuterol sulfate 2.5 mg/3 mL (0.083 %) solution for nebulization   use as needed    albuterol (PROVENTIL/VENTOLIN HFA) 90 mcg/actuation inhaler Inhale 2 puffs into the lungs.    aspirin 81 MG Chew Take 1 tablet (81 mg total) by mouth 2 (two) times a day. Blood clot prevention    DULoxetine (CYMBALTA) 60 MG capsule Take 60 mg by mouth.    esomeprazole (NEXIUM) 40 MG capsule daily as needed.    LIDOcaine (LIDODERM) 5 % lidocaine 5 % topical patch    LYRICA 100 mg capsule Take 100 mg by mouth 3 (three) times daily.    methocarbamoL (ROBAXIN) 750 MG Tab Take 1 tablet (750 mg  total) by mouth every 6 (six) hours as needed (muscle spasms).    pantoprazole (PROTONIX) 40 MG tablet Take 40 mg by mouth every morning.    TRELEGY ELLIPTA 200-62.5-25 mcg inhaler Inhale 1 puff into the lungs.    cefadroxil (DURICEF) 500 MG Cap Take 1 g by mouth every 12 (twelve) hours.    HYDROcodone-acetaminophen (NORCO) 7.5-325 mg per tablet Take 1 tablet by mouth every 6 (six) hours as needed for Pain.    polyethylene glycol (GLYCOLAX) 17 gram PwPk Take 17 g by mouth once daily. for 14 days (Patient not taking: Reported on 1/17/2024)     No current facility-administered medications for this visit.     Facility-Administered Medications Ordered in Other Visits   Medication    prochlorperazine injection Soln 5 mg       Review of patient's allergies indicates:  No Known Allergies    Family History   Family history unknown: Yes       Social History     Socioeconomic History    Marital status:    Tobacco Use    Smoking status: Every Day     Current packs/day: 1.00     Types: Cigarettes    Smokeless tobacco: Never   Substance and Sexual Activity    Alcohol use: Yes     Alcohol/week: 2.0 standard drinks of alcohol     Types: 1 Glasses of wine, 1 Cans of beer per week     Comment: Occasionally    Drug use: Never    Sexual activity: Yes     Partners: Male         Review of Systems:    Denies fevers, chills, chest pain, shortness of breath. Comprehensive review of systems performed and otherwise negative except as noted in HPI      Physical Examination:    General: awake and alert, no acute distress, healthy appearing  Head and Neck: Head atraumatic/normocephalic. Moist MM  CV: brisk cap refill  Lungs: non-labored breathing, w/o cough or SOB  Skin: no rashes present, warm to touch  Neuro: sensation grossly intact distall     Vital Signs:    Vitals:    01/25/24 0848   BP: 111/61   Pulse: 63       Body mass index is 35.51 kg/m².    Examination right knee:    Incision clean dry and intact. No erythema or drainage or  signs of infection.  Sensation intact distally to right foot  Positive FHL/EHL/gastrocsoleus/tib ant  Brisk capillary refill to right foot  No swelling or signs of DVT  Range of motion: extension at 5 and flexion at 105  Stable to varus valgus  Stable to anterior and posterior drawer    X-rays: 3 views of the right knee reviewed. Patient's implants appear well fixed. No signs of loosening or subsidence noted.     Assessment::post op status post R TKA    Plan:  Patient presents to clinic today 2 weeks out from surgery.  Overall she is doing extremely well.  Her knee is stable on exam x-rays today here in clinic.  Encouraged to continue physical therapy for strengthening, range of motion, mobility.  Incision site is well healed and staples were discontinued.  Patient was educated she could shower without fully submerging or putting any lotions x1 week.  She does state understanding of this.  We will have her follow up in clinic in 1 month with x-rays to reassess her knee.  Patient states understanding and agrees with plan of care.    This note was created using [employer] voice recognition software that occasionally misinterpreted phrases or words.    Consult note is delivered via Epic messaging service.

## 2024-02-27 ENCOUNTER — OFFICE VISIT (OUTPATIENT)
Dept: ORTHOPEDICS | Facility: CLINIC | Age: 59
End: 2024-02-27
Payer: MEDICARE

## 2024-02-27 ENCOUNTER — HOSPITAL ENCOUNTER (OUTPATIENT)
Dept: RADIOLOGY | Facility: CLINIC | Age: 59
Discharge: HOME | End: 2024-02-27
Attending: NURSE PRACTITIONER
Payer: MEDICARE

## 2024-02-27 VITALS
HEIGHT: 66 IN | WEIGHT: 229 LBS | DIASTOLIC BLOOD PRESSURE: 66 MMHG | HEART RATE: 65 BPM | SYSTOLIC BLOOD PRESSURE: 105 MMHG | BODY MASS INDEX: 36.8 KG/M2

## 2024-02-27 DIAGNOSIS — Z96.651 AFTERCARE FOLLOWING RIGHT KNEE JOINT REPLACEMENT SURGERY: Primary | ICD-10-CM

## 2024-02-27 DIAGNOSIS — Z47.1 AFTERCARE FOLLOWING RIGHT KNEE JOINT REPLACEMENT SURGERY: Primary | ICD-10-CM

## 2024-02-27 DIAGNOSIS — Z96.651 AFTERCARE FOLLOWING RIGHT KNEE JOINT REPLACEMENT SURGERY: ICD-10-CM

## 2024-02-27 DIAGNOSIS — Z47.1 AFTERCARE FOLLOWING RIGHT KNEE JOINT REPLACEMENT SURGERY: ICD-10-CM

## 2024-02-27 DIAGNOSIS — M17.12 PRIMARY OSTEOARTHRITIS OF LEFT KNEE: ICD-10-CM

## 2024-02-27 PROCEDURE — 20610 DRAIN/INJ JOINT/BURSA W/O US: CPT | Mod: 79,LT,, | Performed by: NURSE PRACTITIONER

## 2024-02-27 PROCEDURE — 1159F MED LIST DOCD IN RCRD: CPT | Mod: CPTII,,, | Performed by: NURSE PRACTITIONER

## 2024-02-27 PROCEDURE — 3074F SYST BP LT 130 MM HG: CPT | Mod: CPTII,,, | Performed by: NURSE PRACTITIONER

## 2024-02-27 PROCEDURE — 3078F DIAST BP <80 MM HG: CPT | Mod: CPTII,,, | Performed by: NURSE PRACTITIONER

## 2024-02-27 PROCEDURE — 99024 POSTOP FOLLOW-UP VISIT: CPT | Mod: ,,, | Performed by: NURSE PRACTITIONER

## 2024-02-27 PROCEDURE — 73562 X-RAY EXAM OF KNEE 3: CPT | Mod: RT,,, | Performed by: NURSE PRACTITIONER

## 2024-02-27 RX ORDER — LIDOCAINE HYDROCHLORIDE 20 MG/ML
5 INJECTION, SOLUTION EPIDURAL; INFILTRATION; INTRACAUDAL; PERINEURAL
Status: DISCONTINUED | OUTPATIENT
Start: 2024-02-27 | End: 2024-02-27 | Stop reason: HOSPADM

## 2024-02-27 RX ORDER — MELOXICAM 15 MG/1
15 TABLET ORAL
COMMUNITY
Start: 2024-02-26

## 2024-02-27 RX ORDER — BETAMETHASONE SODIUM PHOSPHATE AND BETAMETHASONE ACETATE 3; 3 MG/ML; MG/ML
12 INJECTION, SUSPENSION INTRA-ARTICULAR; INTRALESIONAL; INTRAMUSCULAR; SOFT TISSUE
Status: DISCONTINUED | OUTPATIENT
Start: 2024-02-27 | End: 2024-02-27 | Stop reason: HOSPADM

## 2024-02-27 RX ADMIN — BETAMETHASONE SODIUM PHOSPHATE AND BETAMETHASONE ACETATE 12 MG: 3; 3 INJECTION, SUSPENSION INTRA-ARTICULAR; INTRALESIONAL; INTRAMUSCULAR; SOFT TISSUE at 08:02

## 2024-02-27 RX ADMIN — LIDOCAINE HYDROCHLORIDE 5 ML: 20 INJECTION, SOLUTION EPIDURAL; INFILTRATION; INTRACAUDAL; PERINEURAL at 08:02

## 2024-02-27 NOTE — PROGRESS NOTES
Chief Complaint:   Chief Complaint   Patient presents with    Right Knee - Follow-up     Pt states she is doing great. Pt regained her ROM and denies pain. No complaints.       History of present illness: Yaneth Deluna is a 58 y.o. female, presents to clinic today in regards to her right knee.  Patient is about 6 weeks out surgery.  Overall she is doing.  Completed therapy.  Any pain or discomfort in the knee.  Range of motion great.  Patient does continue to have pain to the knee.  She does have a history of osteoarthritis in his knee.  She has been treated in the cortisone injections by her primary care provider.  She would like 1 today here in clinic.  Pain is located more in the medial aspect of the knee.  Time she has not ready for any type of surgical procedures on left side.  She would like to continue with conservative treatment measures.      Past Medical History:   Diagnosis Date    Arthritis     COPD (chronic obstructive pulmonary disease)     Osteoarthritis        Past Surgical History:   Procedure Laterality Date    BACK SURGERY  2014    hardware    CARPAL TUNNEL RELEASE Bilateral     CHOLECYSTECTOMY  2017    neck injections      for pain    ROBOTIC ARTHROPLASTY, KNEE Right 1/10/2024    Procedure: ROBOTIC ARTHROPLASTY, KNEE, TOTAL;  Surgeon: Ifeanyi Arriaga MD;  Location: [facility];  Service: Orthopedics;  Laterality: Right;       Current Outpatient Medications   Medication Sig    albuterol (PROVENTIL) 2.5 mg /3 mL (0.083 %) nebulizer solution albuterol sulfate 2.5 mg/3 mL (0.083 %) solution for nebulization   use as needed    albuterol (PROVENTIL/VENTOLIN HFA) 90 mcg/actuation inhaler Inhale 2 puffs into the lungs.    cefadroxil (DURICEF) 500 MG Cap Take 1 g by mouth every 12 (twelve) hours.    DULoxetine (CYMBALTA) 60 MG capsule Take 60 mg by mouth.    esomeprazole (NEXIUM) 40 MG capsule daily as needed.    LIDOcaine (LIDODERM) 5 % lidocaine 5 % topical patch    LYRICA 100 mg capsule Take 100 mg by  mouth 3 (three) times daily.    meloxicam (MOBIC) 15 MG tablet Take 15 mg by mouth.    pantoprazole (PROTONIX) 40 MG tablet Take 40 mg by mouth every morning.    TRELEGY ELLIPTA 200-62.5-25 mcg inhaler Inhale 1 puff into the lungs.    aspirin 81 MG Chew Take 1 tablet (81 mg total) by mouth 2 (two) times a day. Blood clot prevention    HYDROcodone-acetaminophen (NORCO) 7.5-325 mg per tablet Take 1 tablet by mouth every 6 (six) hours as needed for Pain.     No current facility-administered medications for this visit.     Facility-Administered Medications Ordered in Other Visits   Medication    prochlorperazine injection Soln 5 mg       Review of patient's allergies indicates:  No Known Allergies    Family History   Family history unknown: Yes       Social History     Socioeconomic History    Marital status:    Tobacco Use    Smoking status: Former     Current packs/day: 1.00     Average packs/day: 1 pack/day for 40.1 years (40.1 ttl pk-yrs)     Types: Cigarettes     Start date: 2/2/1984    Smokeless tobacco: Current   Substance and Sexual Activity    Alcohol use: Yes     Alcohol/week: 2.0 standard drinks of alcohol     Types: 1 Glasses of wine, 1 Cans of beer per week     Comment: Occasionally    Drug use: Never    Sexual activity: Yes     Partners: Male         Review of Systems:    Denies fevers, chills, chest pain, shortness of breath. Comprehensive review of systems performed and otherwise negative except as noted in HPI      Physical Examination:    General: awake and alert, no acute distress, healthy appearing  Head and Neck: Head atraumatic/normocephalic. Moist MM  CV: brisk cap refill  Lungs: non-labored breathing, w/o cough or SOB  Skin: no rashes present, warm to touch  Neuro: sensation grossly intact distall     Vital Signs:    Vitals:    02/27/24 0801   BP: 105/66   Pulse: 65       Body mass index is 36.96 kg/m².    Examination right knee:    Incision clean dry and intact. No erythema or drainage or  signs of infection.  Sensation intact distally to right foot  Positive FHL/EHL/gastrocsoleus/tib ant  Brisk capillary refill to right foot  No swelling or signs of DVT  Range of motion: extension at 0 and flexion at 120  Stable to varus valgus  Stable to anterior and posterior drawer    Left knee:   Skin warm, dry, intact.  No ecchymosis or erythema noted.  Tenderness to palpation along the medial aspect of the knee.  Crepitus noted throughout range of motion.    X-rays: 3 views of the right knee reviewed. Patient's implants appear well fixed. No signs of loosening or subsidence noted.     Assessment::post op status post R TKA and primary osteoarthritis left knee    Plan:  Patient presents to clinic today about 6 weeks out from a right total knee.  Overall she is doing very well.  Her knee is stable on exam x-rays today here in clinic.  Denies any pain or discomfort.  Has completed physical therapy.  Encouraged to continue her daily exercises.  Regards to her left knee she does history osteoarthritis.  We will treat this today with a cortisone injection to help calm her pain now.  I have her follow up in 1 year with x-rays of her right knee for evaluation.  Patient states understanding and agrees with plan of care.    This note was created using [employer] voice recognition software that occasionally misinterpreted phrases or words.    Consult note is delivered via Epic messaging service.

## 2024-02-27 NOTE — PROCEDURES
Large Joint Aspiration/Injection: L knee    Date/Time: 2/27/2024 8:00 AM    Performed by: Marylin Fallon FNP  Authorized by: Marylin Fallon FNP    Consent Done?:  Yes (Verbal)  Indications:  Arthritis and pain  Timeout: prior to procedure the correct patient, procedure, and site was verified    Prep: patient was prepped and draped in usual sterile fashion    Local anesthesia used?: No      Details:  Needle Size:  22 G  Ultrasonic Guidance for needle placement?: No    Approach:  Anterolateral  Location:  Knee  Site:  L knee  Medications:  5 mL LIDOcaine (PF) 20 mg/mL (2%) 20 mg/mL (2 %); 12 mg betamethasone acetate-betamethasone sodium phosphate 6 mg/mL  Patient tolerance:  Patient tolerated the procedure well with no immediate complications

## 2024-05-02 NOTE — PROGRESS NOTES
Chief Complaint: Annual exam      HPI:   58 y.o. F  presents for an annual gyn exam.  No c/o.  Had right knee replacement surgery in January.  Doing well.     MENOPAUSAL:  Age at menarche: 14  Age at menopause: 51  Hysterectomy: No  Last pap: 2021, WNL   H/o abnl pap: no  Colposcopy: NA  Postcoital bleeding: No  Sexually active: Not currently    Dyspareunia: No  H/o STI: No   HRT: NO  MM2022, Benign   H/o abnl MMG: NA   Colonoscopy:  appr      FmHx: negative for breast, uterine, ovarian, and colon cancers.        Family History   Family history unknown: Yes         Past Medical History:   Diagnosis Date    Arthritis     COPD (chronic obstructive pulmonary disease)     Osteoarthritis      Past Surgical History:   Procedure Laterality Date    BACK SURGERY      hardware    CARPAL TUNNEL RELEASE Bilateral     CHOLECYSTECTOMY  2017    neck injections      for pain    ROBOTIC ARTHROPLASTY, KNEE Right 1/10/2024    Procedure: ROBOTIC ARTHROPLASTY, KNEE, TOTAL;  Surgeon: Ifeanyi Arriaga MD;  Location: [facility];  Service: Orthopedics;  Laterality: Right;       Current Outpatient Medications:     albuterol (PROVENTIL) 2.5 mg /3 mL (0.083 %) nebulizer solution, albuterol sulfate 2.5 mg/3 mL (0.083 %) solution for nebulization  use as needed, Disp: , Rfl:     albuterol (PROVENTIL/VENTOLIN HFA) 90 mcg/actuation inhaler, Inhale 2 puffs into the lungs., Disp: , Rfl:     aspirin 81 MG Chew, Take 1 tablet (81 mg total) by mouth 2 (two) times a day. Blood clot prevention, Disp: 84 tablet, Rfl: 0    cefadroxil (DURICEF) 500 MG Cap, Take 1 g by mouth every 12 (twelve) hours., Disp: , Rfl:     DULoxetine (CYMBALTA) 60 MG capsule, Take 60 mg by mouth., Disp: , Rfl:     esomeprazole (NEXIUM) 40 MG capsule, daily as needed., Disp: , Rfl:     HYDROcodone-acetaminophen (NORCO) 7.5-325 mg per tablet, Take 1 tablet by mouth every 6 (six) hours as needed for Pain., Disp: , Rfl:     LIDOcaine (LIDODERM) 5 %, lidocaine  5 % topical patch, Disp: , Rfl:     LYRICA 100 mg capsule, Take 100 mg by mouth 3 (three) times daily., Disp: , Rfl:     meloxicam (MOBIC) 15 MG tablet, Take 15 mg by mouth., Disp: , Rfl:     pantoprazole (PROTONIX) 40 MG tablet, Take 40 mg by mouth every morning., Disp: , Rfl:     TRELEGY ELLIPTA 200-62.5-25 mcg inhaler, Inhale 1 puff into the lungs., Disp: , Rfl:   No current facility-administered medications for this visit.    Facility-Administered Medications Ordered in Other Visits:     prochlorperazine injection Soln 5 mg, 5 mg, Intravenous, Q30 Min PRN, Misty Gonzalez MD    Review of patient's allergies indicates:  No Known Allergies    Social History     Tobacco Use    Smoking status: Former     Current packs/day: 1.00     Average packs/day: 1 pack/day for 40.2 years (40.2 ttl pk-yrs)     Types: Cigarettes     Start date: 2/2/1984    Smokeless tobacco: Current   Substance Use Topics    Alcohol use: Yes     Alcohol/week: 2.0 standard drinks of alcohol     Types: 1 Glasses of wine, 1 Cans of beer per week     Comment: Occasionally    Drug use: Never       Review of Systems:  General/Constitutional: Chills denies. Fatigue/weakness denies. Fever denies. Night sweats denies. Hot flashes denies    Respiratory: Cough denies. Hemoptysis denies. SOB denies. Sputum production denies. Wheezing denies .   Cardiovascular: Chest pain denies . Dizziness denies. Palpitations denies. Swelling in hands/feet denies    Gastrointestinal: Abdominal pain denies. Blood in stool denies. Constipation denies. Diarrhea denies. Heartburn denies. Nausea denies. Vomiting denies    Genitourinary: Incontinence denies. Blood in urine denies. Frequent urination denies. Painful urination denies. Urinary urgency denies. Nocturia denies    Gynecologic: Irregular menses denies. Heavy bleeding denies. Painful menses denies. Vaginal discharge denies. Vaginal odor denies. Vaginal itching denies. Vaginal lesion denies. Pelvic pain denies.  "Decreased libido denies. Vulvar lesion denies. Prolapse of genital organs denies. Painful intercourse denies. Postcoital bleeding denies    Psychiatric: Depression denies. Anxiety denies     Physical Exam:   Vitals:    05/08/24 1308   BP: 122/68   Weight: 97.1 kg (214 lb 1.1 oz)   Height: 5' 6" (1.676 m)       Body mass index is 34.55 kg/m².       Chaperone: present.     General appearance: healthy, well-nourished and well-developed     Psychiatric: Orientation to time, place and person. Normal mood and affect and active, alert     Skin: Appearance: no rashes or lesions.     Neck:   Neck: supple, FROM, trachea midline. and no masses   Thyroid: no enlargement or nodules and non-tender.       Cardiovascular:   Auscultation: RRR and no murmur.   Peripheral Vascular: no varicosities, LLE edema, RLE edema, calf tenderness, and palpable cord and pedal pulses intact.     Lungs:   Respiratory effort: no intercostal retractions or accessory muscle usage.   Auscultation: no wheezing, rales/crackles, or rhonchi and clear to auscultation.     Breast:   Inspection/Palpation: no tenderness, discrete/distinct masses, skin changes, or abnormal secretions. Nipple appearance normal.     Abdomen:   Auscultation/Inspection/Palpation: no hepatomegaly, splenomegaly, masses, tenderness or CVA tenderness and soft, non-distended bowel sounds preset.    Hernia: no palpable hernias.     Female Genitalia:    Vulva: no masses, tenderness or lesions    Bladder/Urethra: no urethral discharge or mass, normal meatus, bladder non-distended.    Vagina: no tenderness, erythema, cystocele, rectocele, abnormal vaginal discharge or vesicle(s) or ulcers    Cervix: no discharge, no cervical lacerations noted or motion tenderness and grossly normal    Uterus: normal size and shape and midline, non-tender, and no uterine prolapse.    Adnexa/Parametria: no parametrial tenderness or mass, no adnexal tenderness or ovarian mass.     Lymph Nodes:   Palpation: non " tender submandibular nodes, axillary nodes, or inguinal nodes.     Rectal Exam:   Rectum: normal perianal skin.       Assessment:   1. Encounter for well woman exam with abnormal findings    2. BMI 34.0-34.9,adult        Plan:  Pap w HPV  MMG  Recommend MMG q 1-2 yrs  Self breast awareness  Healthy, calorie restricted diet and regular exercise for cardiovascular and bone health and weight loss  Ca/Vit D  RTC prn/annual

## 2024-05-08 ENCOUNTER — OFFICE VISIT (OUTPATIENT)
Dept: OBSTETRICS AND GYNECOLOGY | Facility: CLINIC | Age: 59
End: 2024-05-08
Payer: MEDICARE

## 2024-05-08 VITALS
HEIGHT: 66 IN | WEIGHT: 214.06 LBS | SYSTOLIC BLOOD PRESSURE: 122 MMHG | DIASTOLIC BLOOD PRESSURE: 68 MMHG | BODY MASS INDEX: 34.4 KG/M2

## 2024-05-08 DIAGNOSIS — Z12.39 ENCOUNTER FOR SCREENING FOR MALIGNANT NEOPLASM OF BREAST, UNSPECIFIED SCREENING MODALITY: ICD-10-CM

## 2024-05-08 DIAGNOSIS — Z01.411 ENCOUNTER FOR WELL WOMAN EXAM WITH ABNORMAL FINDINGS: Primary | ICD-10-CM

## 2024-05-08 DIAGNOSIS — Z12.31 ENCOUNTER FOR SCREENING MAMMOGRAM FOR MALIGNANT NEOPLASM OF BREAST: ICD-10-CM

## 2024-05-08 PROCEDURE — 99396 PREV VISIT EST AGE 40-64: CPT | Mod: ,,, | Performed by: OBSTETRICS & GYNECOLOGY

## 2024-05-08 PROCEDURE — 87624 HPV HI-RISK TYP POOLED RSLT: CPT | Performed by: OBSTETRICS & GYNECOLOGY

## 2024-05-08 PROCEDURE — 3074F SYST BP LT 130 MM HG: CPT | Mod: ,,, | Performed by: OBSTETRICS & GYNECOLOGY

## 2024-05-08 PROCEDURE — 3078F DIAST BP <80 MM HG: CPT | Mod: ,,, | Performed by: OBSTETRICS & GYNECOLOGY

## 2024-05-08 PROCEDURE — 1159F MED LIST DOCD IN RCRD: CPT | Mod: ,,, | Performed by: OBSTETRICS & GYNECOLOGY

## 2024-05-08 PROCEDURE — 3008F BODY MASS INDEX DOCD: CPT | Mod: ,,, | Performed by: OBSTETRICS & GYNECOLOGY

## 2024-05-08 RX ORDER — ALBUTEROL SULFATE 90 UG/1
POWDER, METERED RESPIRATORY (INHALATION)
COMMUNITY

## 2024-05-14 LAB
HIGH RISK HPV: NEGATIVE
PSYCHE PATHOLOGY RESULT: NORMAL

## 2024-05-15 ENCOUNTER — HOSPITAL ENCOUNTER (OUTPATIENT)
Dept: RADIOLOGY | Facility: HOSPITAL | Age: 59
Discharge: HOME | End: 2024-05-15
Attending: OBSTETRICS & GYNECOLOGY
Payer: MEDICARE

## 2024-05-15 DIAGNOSIS — Z12.31 ENCOUNTER FOR SCREENING MAMMOGRAM FOR MALIGNANT NEOPLASM OF BREAST: ICD-10-CM

## 2024-05-15 DIAGNOSIS — Z12.39 ENCOUNTER FOR SCREENING FOR MALIGNANT NEOPLASM OF BREAST, UNSPECIFIED SCREENING MODALITY: ICD-10-CM

## 2024-05-15 PROCEDURE — 77067 SCR MAMMO BI INCL CAD: CPT | Mod: TC

## 2025-01-29 ENCOUNTER — HOSPITAL ENCOUNTER (OUTPATIENT)
Dept: RADIOLOGY | Facility: HOSPITAL | Age: 60
Discharge: HOME | End: 2025-01-29
Attending: FAMILY MEDICINE
Payer: MEDICARE

## 2025-01-29 DIAGNOSIS — M54.50 LUMBAGO: ICD-10-CM

## 2025-01-29 PROCEDURE — 72100 X-RAY EXAM L-S SPINE 2/3 VWS: CPT | Mod: TC

## 2025-02-18 ENCOUNTER — HOSPITAL ENCOUNTER (OUTPATIENT)
Dept: PREADMISSION TESTING | Facility: HOSPITAL | Age: 60
Discharge: HOME | End: 2025-02-18
Attending: FAMILY MEDICINE
Payer: MEDICARE

## 2025-02-18 ENCOUNTER — ANESTHESIA EVENT (OUTPATIENT)
Dept: GASTROENTEROLOGY | Facility: HOSPITAL | Age: 60
End: 2025-02-18
Payer: MEDICARE

## 2025-02-18 VITALS — WEIGHT: 215 LBS | HEIGHT: 66 IN | BODY MASS INDEX: 34.55 KG/M2

## 2025-02-18 DIAGNOSIS — Z12.11 COLON CANCER SCREENING: ICD-10-CM

## 2025-02-18 DIAGNOSIS — Z12.11 SCREEN FOR COLON CANCER: Primary | ICD-10-CM

## 2025-02-18 RX ORDER — FAMOTIDINE 40 MG/1
40 TABLET, FILM COATED ORAL DAILY
COMMUNITY
Start: 2025-01-26

## 2025-02-18 RX ORDER — CYCLOBENZAPRINE HCL 10 MG
10 TABLET ORAL NIGHTLY
COMMUNITY
Start: 2024-12-01

## 2025-02-18 RX ORDER — BUPROPION HYDROCHLORIDE 150 MG/1
150 TABLET, EXTENDED RELEASE ORAL DAILY
COMMUNITY
Start: 2025-02-05

## 2025-02-18 RX ORDER — NALTREXONE HYDROCHLORIDE 50 MG/1
25 TABLET, FILM COATED ORAL 2 TIMES DAILY
COMMUNITY
Start: 2025-02-06

## 2025-02-18 NOTE — DISCHARGE INSTRUCTIONS

## 2025-02-19 NOTE — ANESTHESIA PREPROCEDURE EVALUATION
02/18/2025  Yaneth Deluna is a 59 y.o., female.    Anesthesia History    Surgical History    BACK SURGERY CHOLECYSTECTOMY   CARPAL TUNNEL RELEASE neck injections   ROBOTIC ARTHROPLASTY, KNEE      COVID-19 Risk of Complications  Current as of 3 hours ago    2 0 to < 3 Points: Low Risk   3 to < 6 Points: Medium Risk   6 to 16 Points: High Risk     No Change      Details   Substance History    Smoking Status: Former - 41 pack years   Smokeless Tobacco Status: Current   Alcohol use: Yes; 2.0 standard drinks of alcohol per week   Drug use: Never     Anesthesia Family History    Problem Relations (Age of Onset)   No history of this type found      Current Gender Identity    Questions Responses   Patient's Gender Identity: Female   Patient's sex assigned at birth: Female        Pre-op Assessment    I have reviewed the Patient Summary Reports.     I have reviewed the Nursing Notes. I have reviewed the NPO Status.   I have reviewed the Medications.     Review of Systems  Anesthesia Hx:  No problems with previous Anesthesia             Denies Family Hx of Anesthesia complications.    Denies Personal Hx of Anesthesia complications.                    Social:  Alcohol Use       Hematology/Oncology:  Hematology Normal   Oncology Normal                                   EENT/Dental:  EENT/Dental Normal           Cardiovascular:  Cardiovascular Normal Exercise tolerance: poor                                             Pulmonary:   COPD                     Renal/:  Renal/ Normal                 Hepatic/GI:  Hepatic/GI Normal                    Musculoskeletal:  Arthritis          Spine Disorders: lumbar and cervical            Neurological:  Neurology Normal                                      Endocrine:  Endocrine Normal            Dermatological:  Skin Normal    Psych:  Psychiatric Normal                    Physical  Exam  General: Well nourished, Cooperative, Alert and Oriented    Airway:  Mallampati: II / II  Mouth Opening: Normal  TM Distance: Normal  Tongue: Normal  Neck ROM: Normal ROM    Dental:  Dentures        Anesthesia Plan  Type of Anesthesia, risks & benefits discussed:    Anesthesia Type: MAC  Intra-op Monitoring Plan: Standard ASA Monitors  Post Op Pain Control Plan: multimodal analgesia  Induction:  IV  Airway Plan: Direct  Informed Consent: Informed consent signed with the Patient and all parties understand the risks and agree with anesthesia plan.  All questions answered. Patient consented to blood products? Yes  ASA Score: 3  Day of Surgery Review of History & Physical: H&P Update referred to the surgeon/provider.I have interviewed and examined the patient. I have reviewed the patient's H&P dated: There are no significant changes.     Ready For Surgery From Anesthesia Perspective.     .

## 2025-02-21 ENCOUNTER — HOSPITAL ENCOUNTER (OUTPATIENT)
Dept: GASTROENTEROLOGY | Facility: HOSPITAL | Age: 60
Discharge: HOME | End: 2025-02-21
Attending: FAMILY MEDICINE
Payer: MEDICARE

## 2025-02-21 ENCOUNTER — ANESTHESIA (OUTPATIENT)
Dept: GASTROENTEROLOGY | Facility: HOSPITAL | Age: 60
End: 2025-02-21
Payer: MEDICARE

## 2025-02-21 VITALS
TEMPERATURE: 96 F | DIASTOLIC BLOOD PRESSURE: 46 MMHG | OXYGEN SATURATION: 100 % | WEIGHT: 214.94 LBS | BODY MASS INDEX: 34.55 KG/M2 | HEART RATE: 61 BPM | SYSTOLIC BLOOD PRESSURE: 109 MMHG | RESPIRATION RATE: 20 BRPM | HEIGHT: 66 IN

## 2025-02-21 DIAGNOSIS — Z12.11 SCREEN FOR COLON CANCER: ICD-10-CM

## 2025-02-21 DIAGNOSIS — Z12.11 COLON CANCER SCREENING: ICD-10-CM

## 2025-02-21 PROCEDURE — 27201423 OPTIME MED/SURG SUP & DEVICES STERILE SUPPLY

## 2025-02-21 PROCEDURE — 37000009 HC ANESTHESIA EA ADD 15 MINS

## 2025-02-21 PROCEDURE — 37000008 HC ANESTHESIA 1ST 15 MINUTES

## 2025-02-21 PROCEDURE — 45380 COLONOSCOPY AND BIOPSY: CPT | Mod: PT | Performed by: FAMILY MEDICINE

## 2025-02-21 PROCEDURE — 63600175 PHARM REV CODE 636 W HCPCS: Performed by: NURSE ANESTHETIST, CERTIFIED REGISTERED

## 2025-02-21 RX ORDER — LIDOCAINE HYDROCHLORIDE 20 MG/ML
INJECTION INTRAVENOUS
Status: DISCONTINUED | OUTPATIENT
Start: 2025-02-21 | End: 2025-02-21

## 2025-02-21 RX ORDER — PROPOFOL 10 MG/ML
VIAL (ML) INTRAVENOUS
Status: DISCONTINUED | OUTPATIENT
Start: 2025-02-21 | End: 2025-02-21

## 2025-02-21 RX ADMIN — PROPOFOL 80 MG: 10 INJECTION, EMULSION INTRAVENOUS at 08:02

## 2025-02-21 RX ADMIN — LIDOCAINE HYDROCHLORIDE 100 MG: 20 INJECTION, SOLUTION INTRAVENOUS at 08:02

## 2025-02-21 RX ADMIN — PROPOFOL 110 MG: 10 INJECTION, EMULSION INTRAVENOUS at 08:02

## 2025-02-21 NOTE — DISCHARGE INSTRUCTIONS
Follow-up with Dr HOROWITZ AS NEEDED  Diet: as tolerated  Activity:  decrease activity today, no driving today, resume all activity tomorrow  Notify MD:  increased swelling of abdomen, excessive nausea/vomiting, excessive bright red bleeding from rectum  Medications:  continue your home medications. Keep a list of your home medications at all times for emergencies.

## 2025-02-21 NOTE — ANESTHESIA POSTPROCEDURE EVALUATION
Anesthesia Post Evaluation    Patient: Yaneth Deluna    Procedure(s) Performed: * No procedures listed *    Final Anesthesia Type: MAC      Patient location during evaluation: floor  Patient participation: Yes- Able to Participate  Level of consciousness: awake and alert, awake and oriented  Post-procedure vital signs: reviewed and stable  Pain management: adequate  Airway patency: patent    PONV status at discharge: No PONV  Anesthetic complications: no      Cardiovascular status: blood pressure returned to baseline  Respiratory status: unassisted, room air and spontaneous ventilation  Hydration status: euvolemic  Follow-up not needed.              Vitals Value Taken Time   /54 02/21/25 06:00   Temp 36.2 °C (97.1 °F) 02/21/25 06:00   Pulse 81 02/21/25 06:00   Resp 20 02/21/25 06:00   SpO2 100 % 02/21/25 06:00         No case tracking events are documented in the log.      Pain/Guille Score: No data recorded

## 2025-02-21 NOTE — DISCHARGE SUMMARY
[facility]  [facility] Stay    Colonoscopy      OUTCOME: Patient tolerated treatment/procedure well without complication and is now ready for discharge.    DISPOSITION: Home or Self Care    FINAL DIAGNOSIS:  <principal problem not specified>    FOLLOWUP: In clinic    DISCHARGE INSTRUCTIONS:  No discharge procedures on file.     TIME SPENT ON DISCHARGE:  minutes

## 2025-02-21 NOTE — PLAN OF CARE
Patient went to the restroom, Cloudy brown stool noted, Tap water enema given, Patient tolerated about 3/4 of the bag, Clear results noted

## 2025-02-21 NOTE — PLAN OF CARE
PT IN ROOM AFTER PROCEDURE, AAO x3; PT ABD SOFT AND NON DISTENDED AND PASSING GAS; PT WAS GIVEN SIPS OF COKE AND TOLERATED WITHOUT NAUSEA; IN STABLE CONDITION WITHOUT C/O

## 2025-02-25 LAB — BEAKER SEE SCANNED REPORT: NORMAL

## 2025-03-20 ENCOUNTER — TELEPHONE (OUTPATIENT)
Dept: ORTHOPEDICS | Facility: CLINIC | Age: 60
End: 2025-03-20
Payer: MEDICARE

## 2025-03-26 ENCOUNTER — HOSPITAL ENCOUNTER (OUTPATIENT)
Dept: RADIOLOGY | Facility: HOSPITAL | Age: 60
Discharge: HOME | End: 2025-03-26
Attending: FAMILY MEDICINE
Payer: MEDICARE

## 2025-03-26 DIAGNOSIS — M25.551 RIGHT HIP PAIN: ICD-10-CM

## 2025-03-26 PROCEDURE — 73501 X-RAY EXAM HIP UNI 1 VIEW: CPT | Mod: TC,RT

## 2025-04-23 ENCOUNTER — HOSPITAL ENCOUNTER (OUTPATIENT)
Dept: RADIOLOGY | Facility: HOSPITAL | Age: 60
Discharge: HOME | End: 2025-04-23
Attending: FAMILY MEDICINE
Payer: MEDICARE

## 2025-04-23 DIAGNOSIS — M47.16 SPONDYLOSIS WITH MYELOPATHY, LUMBAR REGION: ICD-10-CM

## 2025-04-23 PROCEDURE — 72148 MRI LUMBAR SPINE W/O DYE: CPT | Mod: TC

## 2025-06-13 NOTE — PROGRESS NOTES
Chief Complaint: Annual exam    Chief Complaint   Patient presents with    Well Woman       HPI:   Yaneth Deluna is a 60 y.o. year old  here for her Annual Exam. Postmenopausal, denies vaginal bleeding. Denies gyn or breast complaints.       Cancer-related family history includes Cervical cancer in her sister. There is no history of Breast cancer, Uterine cancer, or Ovarian cancer.      Gyn History:    Menstrual History  Cycle: No  Menarche Age: 14 years  No Cycle Reason: Other  Other Reason:     Menopause  Menopause Age: 51 years  Post Menopausal Bleeding: No  Hormone Replacement Therapy: No    Pap History  Last pap date: 24  Result: Normal  History of Abnormal Pap: No  HPV Vaccine Completed: No    [place]  Sexually Active: No  STI History: No  Contraception: No    Breast History  Last Breast Imaging Date: Yes  Date: 05/15/24 (Benign)  History of Abnormal Breast Imaging : No  History of Breast Biopsy: No          Past Medical History:   Diagnosis Date    Arthritis     COPD (chronic obstructive pulmonary disease)     Osteoarthritis      Past Surgical History:   Procedure Laterality Date    BACK SURGERY      hardware    CARPAL TUNNEL RELEASE Bilateral     CHOLECYSTECTOMY  2017    neck injections      for pain    ROBOTIC ARTHROPLASTY, KNEE Right 1/10/2024    Procedure: ROBOTIC ARTHROPLASTY, KNEE, TOTAL;  Surgeon: Ifeanyi Arriaga MD;  Location: [facility];  Service: Orthopedics;  Laterality: Right;     Current Medications[1]  Review of patient's allergies indicates:  No Known Allergies  OB History    Para Term  AB Living   1 1 1   1   SAB IAB Ectopic Multiple Live Births       1      # Outcome Date GA Lbr Yon/2nd Weight Sex Type Anes PTL Lv   1 Term 83   3.118 kg (6 lb 14 oz) M Vag-Spont None  ROBY     Social History[2]  Family History   Problem Relation Name Age of Onset    Cervical cancer Sister      Breast cancer Neg Hx      Uterine cancer Neg Hx      Ovarian cancer Neg Hx   "    Colon cancer Neg Hx         Review of Systems:   Review of Systems   Constitutional:  Negative for appetite change, chills, fatigue, fever and unexpected weight change.   Eyes:  Negative for visual disturbance.   Respiratory:  Negative for cough, shortness of breath and wheezing.    Cardiovascular:  Negative for chest pain, palpitations and leg swelling.   Gastrointestinal:  Negative for abdominal pain, bloating, blood in stool, constipation, diarrhea, nausea, vomiting, reflux and fecal incontinence.   Endocrine: Negative for hair loss and hot flashes.   Genitourinary:  Negative for bladder incontinence, decreased libido, dysmenorrhea, dyspareunia, dysuria, flank pain, frequency, genital sores, hematuria, hot flashes, menorrhagia, menstrual problem, pelvic pain, urgency, vaginal bleeding, vaginal discharge, vaginal pain, urinary incontinence, postcoital bleeding, postmenopausal bleeding, vaginal dryness and vaginal odor.   Integumentary:  Negative for rash, acne, hair changes, breast mass, nipple discharge, breast skin changes and breast tenderness.   Neurological:  Negative for headaches.   Psychiatric/Behavioral:  Negative for depression.    Breast: Negative for asymmetry, breast self exam, lump, mass, mastodynia, nipple discharge, skin changes and tenderness       Physical Exam:  /84 (BP Location: Right arm, Patient Position: Sitting)   Ht 5' 6" (1.676 m)   Wt 103 kg (227 lb)   BMI 36.64 kg/m²       Physical Exam:   Constitutional: She is oriented to person, place, and time. She appears well-developed and well-nourished.    HENT:   Head: Normocephalic.      Cardiovascular:       Exam reveals no edema.        Pulmonary/Chest: Effort normal. She exhibits no mass, no tenderness, no bony tenderness, no deformity and no retraction. Right breast exhibits no inverted nipple, no mass, no nipple discharge, no skin change, no tenderness, no bleeding, no swelling, no mastectomy, no augmentation and no " lumpectomy. Left breast exhibits no inverted nipple, no mass, no nipple discharge, no skin change, no tenderness, no bleeding, no swelling, no mastectomy, no augmentation and no lumpectomy. Breasts are symmetrical.        Abdominal: Soft. She exhibits no distension and no mass. There is no abdominal tenderness. There is no rebound and no guarding. No hernia. Hernia confirmed negative in the right inguinal area.     Genitourinary:    Inguinal canal, vagina, uterus, right adnexa, left adnexa and rectum normal.   Rectum:      No anal fissure or external hemorrhoid.   The external female genitalia was normal.   No external genitalia lesions identified,Genitalia hair distrobution normal .     Labial bartholins normal.There is no rash, tenderness, lesion or injury on the right labia. There is no rash, tenderness, lesion or injury on the left labia. Cervix is normal. No no masses or organomegaly. Right adnexum displays no mass, no tenderness and no fullness. Left adnexum displays no mass, no tenderness and no fullness. Vagina exhibits no lesion. No erythema, vaginal discharge, tenderness, bleeding, rectocele, cystocele or prolapse of vaginal walls in the vagina.    No foreign body in the vagina.      No signs of injury in the vagina.   Vagina was moist.Cervix exhibits no motion tenderness, no lesion, no discharge, no friability, no tenderness and no polyp. Uterus consistancy normal and Uerus contour normal  Uterus is not deviated, not enlarged, not fixed, not tender, not hosting fibroids and no uterine prolapse. Normal urethral meatus.Urethral Meatus exhibits: no urethral lesionUrethra findings: no urethral mass, no tenderness and no prolapsedBladder findings: no bladder tenderness          Musculoskeletal: Normal range of motion.      Lymphadenopathy: No inguinal adenopathy noted on the right or left side.    Neurological: She is alert and oriented to person, place, and time.    Skin: Skin is warm and dry.    Psychiatric:  She has a normal mood and affect. Her behavior is normal. Judgment and thought content normal.        Assessment:   Annual Well Women Exam  1. Encounter for well woman exam with abnormal findings    2. BMI 36.0-36.9,adult    3. Encounter for screening for malignant neoplasm of breast, unspecified screening modality  - Mammo Digital Screening Bilat w/ [facility] (XPD); Future        Plan:  Pap UTD  Breast Self-awareness  Recommend annual mammogram  Recommend exercise at least 3 times weekly  Healthy, balanced diet  Keep yearly follow up with PCP    RTC prn/annual     This note was transcribed by Demi Baker. There may be transcription errors as a result, however minimal. Effort has been made to ensure accuracy of transcription, but any obvious errors or omissions should be clarified with the author of the document.       I agree with the above documentation.            [1]   Current Outpatient Medications:     albuterol (PROVENTIL/VENTOLIN HFA) 90 mcg/actuation inhaler, Inhale 2 puffs into the lungs., Disp: , Rfl:     albuterol sulfate (PROAIR RESPICLICK) 90 mcg/actuation inhaler, 2 puffs as needed for SOB, Disp: , Rfl:     buPROPion (WELLBUTRIN SR) 150 MG TBSR 12 hr tablet, Take 150 mg by mouth once daily., Disp: , Rfl:     cyclobenzaprine (FLEXERIL) 10 MG tablet, Take 10 mg by mouth every evening., Disp: , Rfl:     DULoxetine (CYMBALTA) 60 MG capsule, Take 60 mg by mouth., Disp: , Rfl:     esomeprazole (NEXIUM) 40 MG capsule, daily as needed., Disp: , Rfl:     famotidine (PEPCID) 40 MG tablet, Take 40 mg by mouth once daily., Disp: , Rfl:     LYRICA 100 mg capsule, Take 100 mg by mouth 3 (three) times daily., Disp: , Rfl:     meloxicam (MOBIC) 15 MG tablet, Take 15 mg by mouth., Disp: , Rfl:     naltrexone (DEPADE) 50 mg tablet, Take 25 mg by mouth 2 (two) times a day., Disp: , Rfl:     pantoprazole (PROTONIX) 40 MG tablet, Take 40 mg by mouth every morning., Disp: , Rfl:     TRELEGY ELLIPTA 200-62.5-25 mcg inhaler,  Inhale 1 puff into the lungs., Disp: , Rfl:     umeclidinium-vilanteroL (ANORO ELLIPTA) 62.5-25 mcg/actuation DsDv, Take 1 puff by mouth once daily., Disp: , Rfl:   No current facility-administered medications for this visit.    Facility-Administered Medications Ordered in Other Visits:     prochlorperazine injection Soln 5 mg, 5 mg, Intravenous, Q30 Min PRN, Misty Gonzalez MD  [2]   Social History  Tobacco Use    Smoking status: Former     Current packs/day: 1.00     Average packs/day: 1 pack/day for 41.4 years (41.4 ttl pk-yrs)     Types: Cigarettes     Start date: 2/2/1984    Smokeless tobacco: Current   Substance Use Topics    Alcohol use: Yes     Alcohol/week: 2.0 standard drinks of alcohol     Types: 1 Glasses of wine, 1 Cans of beer per week     Comment: Occasionally    Drug use: Never

## 2025-06-18 ENCOUNTER — OFFICE VISIT (OUTPATIENT)
Dept: OBSTETRICS AND GYNECOLOGY | Facility: CLINIC | Age: 60
End: 2025-06-18
Payer: MEDICARE

## 2025-06-18 VITALS
WEIGHT: 227 LBS | DIASTOLIC BLOOD PRESSURE: 84 MMHG | SYSTOLIC BLOOD PRESSURE: 126 MMHG | BODY MASS INDEX: 36.48 KG/M2 | HEIGHT: 66 IN

## 2025-06-18 DIAGNOSIS — Z01.411 ENCOUNTER FOR WELL WOMAN EXAM WITH ABNORMAL FINDINGS: Primary | ICD-10-CM

## 2025-06-18 DIAGNOSIS — Z12.39 ENCOUNTER FOR SCREENING FOR MALIGNANT NEOPLASM OF BREAST, UNSPECIFIED SCREENING MODALITY: ICD-10-CM

## 2025-06-18 PROCEDURE — 3074F SYST BP LT 130 MM HG: CPT | Mod: ,,, | Performed by: OBSTETRICS & GYNECOLOGY

## 2025-06-18 PROCEDURE — 3008F BODY MASS INDEX DOCD: CPT | Mod: ,,, | Performed by: OBSTETRICS & GYNECOLOGY

## 2025-06-18 PROCEDURE — 3079F DIAST BP 80-89 MM HG: CPT | Mod: ,,, | Performed by: OBSTETRICS & GYNECOLOGY

## 2025-06-18 PROCEDURE — 99396 PREV VISIT EST AGE 40-64: CPT | Mod: ,,, | Performed by: OBSTETRICS & GYNECOLOGY

## 2025-06-18 PROCEDURE — 1159F MED LIST DOCD IN RCRD: CPT | Mod: ,,, | Performed by: OBSTETRICS & GYNECOLOGY

## 2025-06-30 ENCOUNTER — HOSPITAL ENCOUNTER (OUTPATIENT)
Dept: RADIOLOGY | Facility: HOSPITAL | Age: 60
Discharge: HOME | End: 2025-06-30
Attending: OBSTETRICS & GYNECOLOGY
Payer: MEDICARE

## 2025-06-30 DIAGNOSIS — Z12.39 ENCOUNTER FOR SCREENING FOR MALIGNANT NEOPLASM OF BREAST, UNSPECIFIED SCREENING MODALITY: ICD-10-CM

## 2025-06-30 PROCEDURE — 77063 BREAST TOMOSYNTHESIS BI: CPT | Mod: TC

## (undated) DEVICE — DRAPE FULL SHEET 70X100IN

## (undated) DEVICE — SUT MONOCRYL 3-0 PS-2 UND

## (undated) DEVICE — GOWN POLY REINF X-LONG 2XL

## (undated) DEVICE — GAUZE SPONGE 4'X4 12 PLY

## (undated) DEVICE — COVER TABLE HVY DTY 60X90IN

## (undated) DEVICE — ELECTRODE PATIENT RETURN DISP

## (undated) DEVICE — GAUZE SPONGE 4X4 12PLY

## (undated) DEVICE — KIT TRIATHLON CR TIB PREP SZ4

## (undated) DEVICE — GLOVE SENSICARE PI GRN 8.5

## (undated) DEVICE — GLOVE SENSICARE PI ORTHO LT 8

## (undated) DEVICE — SUT MONO 2-0 CT-1 VIL

## (undated) DEVICE — CUFF ATS 2 PORT SNGL BLDR 34IN

## (undated) DEVICE — PAD ABDOMINAL STERILE 8X10IN

## (undated) DEVICE — DRAPE STERI U-SHAPED 47X51IN

## (undated) DEVICE — PADDING WYTEX UNDRCST 6INX4YD

## (undated) DEVICE — KIT SURGICAL TURNOVER

## (undated) DEVICE — GLOVE SENSICARE PI GRN 7

## (undated) DEVICE — DRESSING XEROFORM FOIL PK 1X8

## (undated) DEVICE — KIT CHECKPOINT MAKO

## (undated) DEVICE — PIN BONE 3.2X110MM
Type: IMPLANTABLE DEVICE | Site: KNEE | Status: NON-FUNCTIONAL
Removed: 2024-01-10

## (undated) DEVICE — KIT TRIATHLON CR FEM PREP SZ4

## (undated) DEVICE — CUSHION  WC FOAM 20X20X.75IN

## (undated) DEVICE — SOL NACL IRR 1000ML BTL

## (undated) DEVICE — WRAP DEMAYO LEG STERILE

## (undated) DEVICE — DEVICE STRATAFIX SYMMETRIC +

## (undated) DEVICE — DRAPE MEDIUM SHEET 40X70IN

## (undated) DEVICE — BLADE SAG DUAL CUT 18X90X1.35

## (undated) DEVICE — KIT DRAPE RIO ONE PIECE W/POCK

## (undated) DEVICE — SUT VICRYL BR 1 GEN 27 CT-1

## (undated) DEVICE — BLADE MAKO NARROW

## (undated) DEVICE — Device

## (undated) DEVICE — GLOVE SIGNATURE ESSNTL LTX 8.5

## (undated) DEVICE — SOL NORMAL USPCA 0.9%

## (undated) DEVICE — GLOVE SENSICARE PI MICRO 6.5

## (undated) DEVICE — PAD ABD 8X10 STERILE

## (undated) DEVICE — DRESSING XEROFORM 1X8IN

## (undated) DEVICE — APPLICATOR CHLORAPREP ORN 26ML

## (undated) DEVICE — BANDAGE ACE DOUBLE STER 6IN

## (undated) DEVICE — KIT VIZADISC KNEE TRACKING

## (undated) DEVICE — SOL POVIDONE IODINE PCH 3/4OZ

## (undated) DEVICE — CORD SILICONE RETRACTOR

## (undated) DEVICE — TAPE SILK 3IN